# Patient Record
Sex: FEMALE | Race: WHITE | Employment: FULL TIME | URBAN - METROPOLITAN AREA
[De-identification: names, ages, dates, MRNs, and addresses within clinical notes are randomized per-mention and may not be internally consistent; named-entity substitution may affect disease eponyms.]

---

## 2017-04-05 DIAGNOSIS — N95.1 MENOPAUSAL SYNDROME: ICD-10-CM

## 2017-04-05 RX ORDER — ESTRADIOL 0.5 MG/1
TABLET ORAL
Qty: 90 TAB | Refills: 0 | Status: SHIPPED | OUTPATIENT
Start: 2017-04-05 | End: 2017-07-03 | Stop reason: DRUGHIGH

## 2017-04-10 ENCOUNTER — OFFICE VISIT (OUTPATIENT)
Dept: OBGYN CLINIC | Age: 47
End: 2017-04-10

## 2017-04-10 VITALS
SYSTOLIC BLOOD PRESSURE: 114 MMHG | BODY MASS INDEX: 31.18 KG/M2 | WEIGHT: 176 LBS | DIASTOLIC BLOOD PRESSURE: 72 MMHG | HEIGHT: 63 IN

## 2017-04-10 DIAGNOSIS — N39.3 STRESS INCONTINENCE OF URINE: Primary | ICD-10-CM

## 2017-04-10 LAB
BILIRUB UR QL STRIP: NEGATIVE
GLUCOSE UR-MCNC: NEGATIVE MG/DL
KETONES P FAST UR STRIP-MCNC: NEGATIVE MG/DL
PH UR STRIP: 4.6 [PH] (ref 4.6–8)
PROT UR QL STRIP: NEGATIVE MG/DL
SP GR UR STRIP: 1 (ref 1–1.03)
UA UROBILINOGEN AMB POC: NORMAL (ref 0.2–1)
URINALYSIS CLARITY POC: CLEAR
URINALYSIS COLOR POC: YELLOW
URINE BLOOD POC: NEGATIVE
URINE LEUKOCYTES POC: NEGATIVE
URINE NITRITES POC: NEGATIVE

## 2017-04-10 NOTE — PROGRESS NOTES
Urinary incontinence evaluation    Lexi Mandujano is a ,  48 y.o. female [facility] who presents today with had concerns including Urinary Incontinence. .     Her symptoms started 4 weeks ago, unchanged since that time. Has actually had longer than that. Just worse now to where she is concerned about it. She leaks urine on a regular basis. She loses urine when she coughs/laughs/sneezes. Symptoms are exacerbated by exercise. Patient's other complaints: none. Her symptoms are moderate. No nocturia. Patient denies back pain, fever and vaginal discharge. There is not any concern of sexual abuse. There is not a history of trauma to the genital area other than childbirth. Patient does not have a history of recurrent UTI. She has a history of  has a past medical history of Autoimmune disease ([facility] [address] Encounter for insertion of mirena IUD; Encounter for IUD removal (14); Hypertension; Ill-defined condition; Migraine; and Unspecified adverse effect of anesthesia. with the following surgical history  has a past surgical history that includes tubal ligation (); cholecystectomy (); and laparoscopic supracervical hysterectomy (14). .  . She does not have a history of previous evaluation of incontinence. She has not been evaluated for her current complaints. Urine dipstick shows negative for all components.   Results for orders placed or performed in visit on 04/10/17   AMB POC URINALYSIS DIP STICK MANUAL W/O MICRO   Result Value Ref Range    Color (UA POC) Yellow     Clarity (UA POC) Clear     Glucose (UA POC) Negative Negative    Bilirubin (UA POC) Negative Negative    Ketones (UA POC) Negative Negative    Specific gravity (UA POC) 1.001 1.001 - 1.035    Blood (UA POC) Negative Negative    pH (UA POC) 4.6 4.6 - 8.0    Protein (UA POC) Negative Negative mg/dL    Urobilinogen (UA POC) 0.2 mg/dL 0.2 - 1    Nitrites (UA POC) Negative Negative    Leukocyte esterase (UA POC) Negative Negative       Past Medical History:   Diagnosis Date    Autoimmune disease ([facility] [address]     Raytom[place]ud    Encounter for insertion of mirena IUD     pt states replaced in March 2013    Encounter for IUD removal 7/21/14    Hypertension     Ill-defined condition     migraines    Migraine     LAST HEADACHE 6-30-14    Unspecified adverse effect of anesthesia     pt states freaks out upon awaking, will try to get up     Past Surgical History:   Procedure Laterality Date    HX CHOLECYSTECTOMY  2012    HX LAPAROSCOPIC SUPRACERVICAL HYSTERECTOMY  7/21/14    Mirena Removal    HX TUBAL LIGATION  1993     Social History     Occupational History    Not on file. Social History Main Topics    Smoking status: Never Smoker    Smokeless tobacco: Never Used    Alcohol use Yes      Comment: 3-4 DRINKS PER WEEK    Drug use: No    Sexual activity: Yes     Partners: Male     Birth control/ protection: Surgical      Comment: Tubal Ligation//Vasectomy/Hysterectomy     Family History   Problem Relation Age of Onset    Endometriosis Mother     Endometriosis Maternal Grandmother     Lung Disease Father        Allergies   Allergen Reactions    Doxycycline Hives     Prior to Admission medications    Medication Sig Start Date End Date Taking? Authorizing Provider   estradiol (ESTRACE) 0.5 mg tablet TAKE 1 TABLET BY MOUTH DAILY 4/5/17   Daxa Jacobs MD   losartan (COZAAR) 25 mg tablet  5/20/16   Historical Provider   cyclobenzaprine (FLEXERIL) 10 mg tablet  5/23/16   Historical Provider   erythromycin (ILOTYCIN) ophthalmic ointment Administer 0.5 cm to left eye every six (6) hours. 3/18/15   GILDARDO Martin   HYDROcodone-acetaminophen (NORCO) 5-325 mg per tablet Take 1 Tab by mouth every four (4) hours as needed for Pain. Max Daily Amount: 6 Tabs.  3/18/15   GILDARDO Malave   lisinopril (PRINIVIL, ZESTRIL) 10 mg tablet  1/26/15   Historical Provider   cloNIDine HCl (CATAPRES) 0.1 mg tablet  1/22/15   Historical Provider   amoxicillin-clavulanate (AUGMENTIN) 500-125 mg per tablet  8/2/14   Historical Provider   oxyCODONE-acetaminophen (PERCOCET 10)  mg per tablet Take 1 Tab by mouth every six (6) hours as needed for Pain. 7/21/14   Dex Valverde MD   multivitamin (ONE A DAY) tablet Take 1 Tab by mouth daily. Historical Provider   amLODIPine (NORVASC) 5 mg tablet Take 5 mg by mouth daily. 4/24/14   Historical Provider   hydrochlorothiazide (HYDRODIURIL) 25 mg tablet 25 mg daily. 4/25/14   Historical Provider   naproxen (NAPROSYN) 500 mg tablet Take 500 mg by mouth every six (6) hours as needed.  5/20/14   Historical Provider        Review of Systems: History obtained from the patient  Constitutional: negative for weight loss, fever, night sweats  Breast: negative for breast lumps, nipple discharge, galactorrhea  GI: negative for change in bowel habits, abdominal pain, black or bloody stools  : see HPI, negative for vaginal discharge  MSK: negative for back pain, joint pain, muscle pain  Skin: negative for itching, rash, hives  Psych: negative for anxiety, depression, change in mood      Objective:    Visit Vitals    /72    Ht 5' 3\" (1.6 m)    Wt 176 lb (79.8 kg)    LMP 05/20/2014    BMI 31.18 kg/m2       Physical Exam:   PHYSICAL EXAMINATION    Constitutional  · Appearance: well-nourished, well developed, alert, in no acute distress    Gastrointestinal  · Abdominal Examination: abdomen non-tender to palpation, normal bowel sounds, no masses present  · Liver and spleen: no hepatomegaly present, spleen not palpable  · Hernias: no hernias identified    Genitourinary  · External Genitalia: normal appearance for age, no discharge present, no tenderness present, no inflammatory lesions present, no masses present, no atrophy present  · Vagina: normal vaginal vault with moderate cystocele and bladder neck defects, no discharge present, no inflammatory lesions present, no masses present  · Bladder: tender to palpation  · Urethra: appears normal  · Cervix: normal   · Uterus: absent  · Adnexa: no adnexal tenderness present, no adnexal masses present  · Perineum: perineum within normal limits, no evidence of trauma, no rashes or skin lesions present  · Anus: anus within normal limits, no hemorrhoids present  · Inguinal Lymph Nodes: no lymphadenopathy present    Skin  · General Inspection: no rash, no lesions identified    Neurologic/Psychiatric  · Mental Status:  · Orientation: grossly oriented to person, place and time  · Mood and Affect: mood normal, affect appropriate    Assessment:   Urinary incontinence, stress. Mild to moderate cystocele. Plan:   URO and TVT.   IC obtained, NFQ.

## 2017-04-27 DIAGNOSIS — N39.3 FEMALE STRESS INCONTINENCE: Primary | ICD-10-CM

## 2017-05-19 ENCOUNTER — OFFICE VISIT (OUTPATIENT)
Dept: OBGYN CLINIC | Age: 47
End: 2017-05-19

## 2017-05-19 ENCOUNTER — HOSPITAL ENCOUNTER (OUTPATIENT)
Dept: PREADMISSION TESTING | Age: 47
Discharge: HOME | End: 2017-05-19
Payer: COMMERCIAL

## 2017-05-19 ENCOUNTER — TELEPHONE (OUTPATIENT)
Dept: OBGYN CLINIC | Age: 47
End: 2017-05-19

## 2017-05-19 VITALS
SYSTOLIC BLOOD PRESSURE: 118 MMHG | HEART RATE: 60 BPM | RESPIRATION RATE: 20 BRPM | OXYGEN SATURATION: 100 % | DIASTOLIC BLOOD PRESSURE: 79 MMHG | TEMPERATURE: 97.3 F

## 2017-05-19 VITALS
SYSTOLIC BLOOD PRESSURE: 132 MMHG | DIASTOLIC BLOOD PRESSURE: 84 MMHG | WEIGHT: 179 LBS | HEIGHT: 63 IN | BODY MASS INDEX: 31.71 KG/M2

## 2017-05-19 DIAGNOSIS — N39.3 SUI (STRESS URINARY INCONTINENCE, FEMALE): Primary | ICD-10-CM

## 2017-05-19 DIAGNOSIS — N39.3 FEMALE STRESS INCONTINENCE: ICD-10-CM

## 2017-05-19 LAB
ABO + RH BLD: NORMAL
ANION GAP BLD CALC-SCNC: 6 MMOL/L (ref 5–15)
ATRIAL RATE: 58 BPM
BLOOD GROUP ANTIBODIES SERPL: NORMAL
BUN SERPL-MCNC: 14 MG/DL (ref 6–20)
BUN/CREAT SERPL: 18 (ref 12–20)
CALCIUM SERPL-MCNC: 8.8 MG/DL (ref 8.5–10.1)
CALCULATED P AXIS, ECG09: 30 DEGREES
CALCULATED R AXIS, ECG10: 43 DEGREES
CALCULATED T AXIS, ECG11: 23 DEGREES
CHLORIDE SERPL-SCNC: 101 MMOL/L (ref 97–108)
CO2 SERPL-SCNC: 30 MMOL/L (ref 21–32)
CREAT SERPL-MCNC: 0.8 MG/DL (ref 0.55–1.02)
DIAGNOSIS, 93000: NORMAL
ERYTHROCYTE [DISTWIDTH] IN BLOOD BY AUTOMATED COUNT: 12.9 % (ref 11.5–14.5)
GLUCOSE SERPL-MCNC: 82 MG/DL (ref 65–100)
HCT VFR BLD AUTO: 40 % (ref 35–47)
HGB BLD-MCNC: 13.9 G/DL (ref 11.5–16)
MCH RBC QN AUTO: 29.6 PG (ref 26–34)
MCHC RBC AUTO-ENTMCNC: 34.8 G/DL (ref 30–36.5)
MCV RBC AUTO: 85.3 FL (ref 80–99)
P-R INTERVAL, ECG05: 150 MS
PLATELET # BLD AUTO: 200 K/UL (ref 150–400)
POTASSIUM SERPL-SCNC: 3.7 MMOL/L (ref 3.5–5.1)
Q-T INTERVAL, ECG07: 422 MS
QRS DURATION, ECG06: 86 MS
QTC CALCULATION (BEZET), ECG08: 414 MS
RBC # BLD AUTO: 4.69 M/UL (ref 3.8–5.2)
SODIUM SERPL-SCNC: 137 MMOL/L (ref 136–145)
SPECIMEN EXP DATE BLD: NORMAL
VENTRICULAR RATE, ECG03: 58 BPM
WBC # BLD AUTO: 4.3 K/UL (ref 3.6–11)

## 2017-05-19 PROCEDURE — 36415 COLL VENOUS BLD VENIPUNCTURE: CPT | Performed by: OBSTETRICS & GYNECOLOGY

## 2017-05-19 PROCEDURE — 93005 ELECTROCARDIOGRAM TRACING: CPT

## 2017-05-19 PROCEDURE — 86900 BLOOD TYPING SEROLOGIC ABO: CPT | Performed by: OBSTETRICS & GYNECOLOGY

## 2017-05-19 PROCEDURE — 85027 COMPLETE CBC AUTOMATED: CPT | Performed by: OBSTETRICS & GYNECOLOGY

## 2017-05-19 PROCEDURE — 80048 BASIC METABOLIC PNL TOTAL CA: CPT | Performed by: ANESTHESIOLOGY

## 2017-05-19 RX ORDER — RIZATRIPTAN BENZOATE 10 MG/1
10 TABLET ORAL
COMMUNITY

## 2017-05-19 RX ORDER — CEPHALEXIN 500 MG/1
500 CAPSULE ORAL 4 TIMES DAILY
COMMUNITY

## 2017-05-19 RX ORDER — HYDROCHLOROTHIAZIDE 12.5 MG/1
12.5 TABLET ORAL DAILY
COMMUNITY

## 2017-05-19 NOTE — PROGRESS NOTES
[facility]  OFFICE PROCEDURE PROGRESS NOTE           Chart reviewed for the following:  Alejandro SIMMONS, have reviewed the History, Physical and updated the Allergic reactions for [address] performed immediately prior to start of procedure:  Alejandro SIMMONS, have performed the following reviews on [address] prior to the start of the procedure:      * Patient was identified by name and date of birth   * Agreement on procedure being performed was verified  * Risks and Benefits explained to the patient  * Procedure site verified and marked as necessary  * Patient was positioned for comfort  * Consent was signed and verified      Time: 1:20pm        Date of procedure: 2017     Procedure performed by: Kimberly Mark MD     Provider assisted by: Alejandro Minaya MA     Patient assisted by: self     How tolerated by patient: tolerated the procedure well with no complications     Post Procedural Pain Scale: 0 - No Hurt     Comments: none      Procedure note: Urodynamics      Indications:  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 female [facility] Patient's last menstrual period was 2014. She complains of moderate and life-altering symptoms of stress incontinence and prolapse for years. She is here today for urodynamic testing. Procedure: Urodynamic testing was performed with the Lumax machine. Pressure flow testing via Lumax device was performed. Voided volume was 457 cc. Maximum flow rate achieved was 25.9 cc/sec. Average flow rate was 18.3 cc/sec. Post void residual was 30 cc (15 minutes later). She felt the first filling sensation at 353 cc, had the urge to urinate at 389 cc, strong urge at 401 cc, and very uncomfortable at 409 cc. Bladder filling showed normal pressures   The total bladder volume achieved was 409 cc. Leak point pressure was achieved at 33 cm of water. The leak volume was 8 cc. Q-tip deflection was 30 degrees.    She was noted to lose urine when she coughed in the sitting position. There was no urine loss with manual support of one side of the urethra. EMG was not completed. Urethral sphincter length is 2 cm. Maximum urethral closure pressure was 62 cm of water. Impression:  Pure USI with normal capacity bladder.   Plan:  TVT-O with 500 cc voiding trial.      Care Provider: Cydney Ellis M.D.

## 2017-05-19 NOTE — TELEPHONE ENCOUNTER
Patient called to find out what was expected for the Urodynamics. Stated she misplaced her paperwork. Informed her to have a full bladder. She verbalized understanding.

## 2017-05-19 NOTE — PERIOP NOTES
[facility]  PREOPERATIVE INSTRUCTIONS    Surgery Date:   5/25/17  Surgery arrival time given by surgeon: NO   If no,[facility] [address] staff will call you between 4 PM- 8 PM the day before surgery with your arrival time. If your surgery is on a Monday, we will call you the preceding Friday. Please call [phone] after 8 PM if you did not receive your arrival time. 1. Please report at the designated time to the 2nd [address] [facility]. Bring your insurance card, photo identification, and any copayment ( if applicable). 2. You must have a responsible adult to drive you home. You need to have a responsible adult to stay with you the first 24 hours after surgery if you are going home the same day of your surgery and you should not drive a car for 24 hours following your surgery. 3. Nothing to eat or drink after midnight the night before surgery. This includes no water, gum, mints, coffee, juice, etc.  Please note special instructions, if applicable, below for medications. 4. MEDICATIONS TO TAKE THE MORNING OF SURGERY WITH A SIP OF WATER: none  5. No alcoholic beverages 24 hours before or after your surgery. 6. If you are being admitted to the hospital,please leave personal belongings/luggage in your car until you have an assigned hospital room number. 7. Stop Aspirin and/or any non-steroidal anti-inflammatory drugs (i.e. Ibuprofen, Naproxen, Advil, Aleve) as directed by your surgeon. You may take Tylenol. Stop herbal supplements 1 week prior to  surgery. 8. If you are currently taking Plavix, Coumadin,or any other blood-thinning/anticoagulant medication contact your surgeon for instructions. 9. Please wear comfortable clothes. Wear your glasses instead of contacts. We ask that all money, jewelry and valuables be left at home. Wear no make up, particularly mascara, the day of surgery. 10.  All body piercings, rings,and jewelry need to be removed and left at home. Please wear your hair loose or down.  Please no pony-tails, buns, or any metal hair accessories. If you shower the morning of surgery, please do not apply any lotions, powders, or deodorants afterwards. Do not shave any body area within 24 hours of your surgery. 11. Please follow all instructions to avoid any potential surgical cancellation. 12.  Should your physical condition change, (i.e. fever, cold, flu, etc.) please notify your surgeon as soon as possible. 13. It is important to be on time. If a situation occurs where you may be delayed, please call:  [phone] on the day of surgery. 14. The Preadmission Testing staff can be reached at [phone]. .  15. Special instructions: free  parking,     The patient was contacted  in person. She  verbalize  understanding of all instructions does not  need reinforcement.

## 2017-05-24 ENCOUNTER — ANESTHESIA EVENT (OUTPATIENT)
Dept: SURGERY | Age: 47
End: 2017-05-24
Payer: COMMERCIAL

## 2017-05-24 NOTE — H&P
Gynecology History and Physical    Name: Florencia Logan MRN: [id number] SSN: [id number]    YOB: 1970  Age: 48 y.o. Sex: female       Subjective:      Chief complaint: Urinary incontinence    Namita Tatum is a 48 y.o.  female with a history of urinary incontinence. Previous evaluation has been done with urodynamics, confirming the presence of stress incontinence and the need for urethral support surgery. Previous treatment has consisted of kegel exercises and wearing protection. She is now admitted for outpatient surgery consisting of TVT-O with cystoscopy. (Procedure(s) (LRB):  TENSION FREE VAGINAL TAPING OBTURATOR  (N/A))    The current method of family planning is status post hysterectomy. OB History      Para Term  AB TAB SAB Ectopic Multiple Living    2 2 2       2        Past Medical History:   Diagnosis Date    Autoimmune disease ([facility] [address]     Raynaud    Encounter for insertion of mirena IUD     pt states replaced in 2013    Encounter for IUD removal 14    Hypertension     Ill-defined condition     migraines    Migraine     LAST HEADACHE 14    Unspecified adverse effect of anesthesia     pt states freaks out upon awaking, will try to get up     Past Surgical History:   Procedure Laterality Date    HX CHOLECYSTECTOMY      HX HYSTERECTOMY      HX LAPAROSCOPIC SUPRACERVICAL HYSTERECTOMY  14    Mirena Removal    HX ORTHOPAEDIC Left     CTR    [address]    HX UROLOGICAL  2017    Urodynamics     Social History     Occupational History    Not on file.      Social History Main Topics    Smoking status: Never Smoker    Smokeless tobacco: Never Used    Alcohol use Yes      Comment: 3-4 DRINKS PER WEEK    Drug use: No    Sexual activity: Yes     Partners: Male     Birth control/ protection: Surgical      Comment: Tubal Ligation//Vasectomy/Hysterectomy     Family History   Problem Relation Age of Onset    Endometriosis Mother     Lung Disease Father     Endometriosis Maternal Grandmother         Allergies   Allergen Reactions    Doxycycline Hives     Prior to Admission medications    Medication Sig Start Date End Date Taking? Authorizing Provider   hydroCHLOROthiazide (HYDRODIURIL) 12.5 mg tablet Take 12.5 mg by mouth daily. Historical Provider   LOSARTAN PO Take 12.5 mg by mouth daily. Historical Provider   rizatriptan (MAXALT) 10 mg tablet Take 10 mg by mouth once as needed for Migraine. May repeat in 2 hours if needed    Historical Provider   cephALEXin (KEFLEX) 500 mg capsule Take 500 mg by mouth four (4) times daily. Historical Provider   estradiol (ESTRACE) 0.5 mg tablet TAKE 1 TABLET BY MOUTH DAILY 4/5/17   Owen Brown MD   cyclobenzaprine (FLEXERIL) 10 mg tablet daily as needed. 5/23/16   Historical Provider        Review of Systems:  History obtained from the patient  Negative for:  Constitutional: weight loss, fever, night sweats  HEENT: hearing loss, earache, congestion, snoring, sorethroat  CV: chest pain, palpitations, edema  Resp: cough, shortness of breath, wheezing  Breast: breast lumps, nipple discharge, galactorrhea  GI: change in bowel habits, abdominal pain, black or bloody stools  : frequency, dysuria, hematuria, vaginal discharge  MSK: back pain, joint pain, muscle pain  Skin: itching, rash, hives  Neuro: dizziness, headache, confusion, weakness  Psych: anxiety, depression, change in mood  Heme/lymph: bleeding, bruising, pallor        Objective: There were no vitals filed for this visit.     Physical Exam:  Heart: Regular rate and rhythm  Lung: clear to auscultation throughout lung fields, no wheezes, no rales, no rhonchi and normal respiratory effort  Abdomen: soft, nontender  External Genitalia: normal general appearance  Urinary system: urethral meatus normal  Vagina: normal mucosa without prolapse or lesions  Cervix: normal appearance  Adnexa: normal bimanual exam  Uterus: absent and removed surgically    Assessment:   Urinary incontinence with stress incontinence. TVT-O IC obtained, NFQ  400 cc voiding trial.    Plan:     Procedure(s) (LRB):  TENSION FREE VAGINAL TAPING OBTURATOR  (N/A)  Discussed the risks of surgery including the risks of bleeding, infection, deep vein thrombosis, and surgical injuries to internal organs including but not limited to the bowels, bladder, rectum, and female reproductive organs. The patient understands the risks; any and all questions were answered to the patient's satisfaction.     Signed By:  Owen Brown MD     May 24, 2017

## 2017-05-25 ENCOUNTER — ANESTHESIA (OUTPATIENT)
Dept: SURGERY | Age: 47
End: 2017-05-25
Payer: COMMERCIAL

## 2017-05-25 ENCOUNTER — HOSPITAL ENCOUNTER (OUTPATIENT)
Age: 47
Setting detail: OUTPATIENT SURGERY
Discharge: HOME | End: 2017-05-25
Attending: OBSTETRICS & GYNECOLOGY | Admitting: OBSTETRICS & GYNECOLOGY
Payer: COMMERCIAL

## 2017-05-25 VITALS
DIASTOLIC BLOOD PRESSURE: 69 MMHG | OXYGEN SATURATION: 98 % | HEART RATE: 65 BPM | SYSTOLIC BLOOD PRESSURE: 103 MMHG | RESPIRATION RATE: 16 BRPM | TEMPERATURE: 97.5 F

## 2017-05-25 DIAGNOSIS — N39.3 FEMALE STRESS INCONTINENCE: ICD-10-CM

## 2017-05-25 PROCEDURE — 77030031139 HC SUT VCRL2 J&J -A: Performed by: OBSTETRICS & GYNECOLOGY

## 2017-05-25 PROCEDURE — 74011250636 HC RX REV CODE- 250/636: Performed by: ANESTHESIOLOGY

## 2017-05-25 PROCEDURE — 77030018836 HC SOL IRR NACL ICUM -A

## 2017-05-25 PROCEDURE — 76010000138 HC OR TIME 0.5 TO 1 HR: Performed by: OBSTETRICS & GYNECOLOGY

## 2017-05-25 PROCEDURE — 77030034850: Performed by: OBSTETRICS & GYNECOLOGY

## 2017-05-25 PROCEDURE — 74011250636 HC RX REV CODE- 250/636

## 2017-05-25 PROCEDURE — 74011000250 HC RX REV CODE- 250: Performed by: OBSTETRICS & GYNECOLOGY

## 2017-05-25 PROCEDURE — 77030019927 HC TBNG IRR CYSTO BAXT -A: Performed by: OBSTETRICS & GYNECOLOGY

## 2017-05-25 PROCEDURE — 76060000032 HC ANESTHESIA 0.5 TO 1 HR: Performed by: OBSTETRICS & GYNECOLOGY

## 2017-05-25 PROCEDURE — C1771 REP DEV, URINARY, W/SLING: HCPCS | Performed by: OBSTETRICS & GYNECOLOGY

## 2017-05-25 PROCEDURE — 77030011640 HC PAD GRND REM COVD -A: Performed by: OBSTETRICS & GYNECOLOGY

## 2017-05-25 PROCEDURE — 77030032490 HC SLV COMPR SCD KNE COVD -B: Performed by: OBSTETRICS & GYNECOLOGY

## 2017-05-25 PROCEDURE — 77030018836 HC SOL IRR NACL ICUM -A: Performed by: OBSTETRICS & GYNECOLOGY

## 2017-05-25 PROCEDURE — 76210000023 HC REC RM PH II 2 TO 2.5 HR: Performed by: OBSTETRICS & GYNECOLOGY

## 2017-05-25 PROCEDURE — 74011250636 HC RX REV CODE- 250/636: Performed by: OBSTETRICS & GYNECOLOGY

## 2017-05-25 PROCEDURE — 76210000006 HC OR PH I REC 0.5 TO 1 HR: Performed by: OBSTETRICS & GYNECOLOGY

## 2017-05-25 PROCEDURE — 77030018832 HC SOL IRR H20 ICUM -A: Performed by: OBSTETRICS & GYNECOLOGY

## 2017-05-25 PROCEDURE — 77030020782 HC GWN BAIR PAWS FLX 3M -B

## 2017-05-25 PROCEDURE — 74011000250 HC RX REV CODE- 250

## 2017-05-25 PROCEDURE — 77030020143 HC AIRWY LARYN INTUB CGAS -A: Performed by: ANESTHESIOLOGY

## 2017-05-25 DEVICE — SLING GYN TRANSOBTURATOR APPRCH GYNECARE TVT ABBREVO: Type: IMPLANTABLE DEVICE | Site: VAGINA | Status: FUNCTIONAL

## 2017-05-25 RX ORDER — SODIUM CHLORIDE 0.9 % (FLUSH) 0.9 %
5-10 SYRINGE (ML) INJECTION EVERY 8 HOURS
Status: DISCONTINUED | OUTPATIENT
Start: 2017-05-25 | End: 2017-05-25 | Stop reason: HOSPADM

## 2017-05-25 RX ORDER — HYDROCODONE BITARTRATE AND ACETAMINOPHEN 7.5; 325 MG/1; MG/1
1 TABLET ORAL
Qty: 24 TAB | Refills: 0 | Status: SHIPPED | OUTPATIENT
Start: 2017-05-25

## 2017-05-25 RX ORDER — LIDOCAINE HYDROCHLORIDE 20 MG/ML
INJECTION, SOLUTION EPIDURAL; INFILTRATION; INTRACAUDAL; PERINEURAL AS NEEDED
Status: DISCONTINUED | OUTPATIENT
Start: 2017-05-25 | End: 2017-05-25 | Stop reason: HOSPADM

## 2017-05-25 RX ORDER — DEXAMETHASONE SODIUM PHOSPHATE 4 MG/ML
INJECTION, SOLUTION INTRA-ARTICULAR; INTRALESIONAL; INTRAMUSCULAR; INTRAVENOUS; SOFT TISSUE AS NEEDED
Status: DISCONTINUED | OUTPATIENT
Start: 2017-05-25 | End: 2017-05-25 | Stop reason: HOSPADM

## 2017-05-25 RX ORDER — PROPOFOL 10 MG/ML
INJECTION, EMULSION INTRAVENOUS AS NEEDED
Status: DISCONTINUED | OUTPATIENT
Start: 2017-05-25 | End: 2017-05-25 | Stop reason: HOSPADM

## 2017-05-25 RX ORDER — HYDROMORPHONE HYDROCHLORIDE 1 MG/ML
.25-1 INJECTION, SOLUTION INTRAMUSCULAR; INTRAVENOUS; SUBCUTANEOUS
Status: DISCONTINUED | OUTPATIENT
Start: 2017-05-25 | End: 2017-05-26 | Stop reason: HOSPADM

## 2017-05-25 RX ORDER — SODIUM CHLORIDE 0.9 % (FLUSH) 0.9 %
5-10 SYRINGE (ML) INJECTION AS NEEDED
Status: DISCONTINUED | OUTPATIENT
Start: 2017-05-25 | End: 2017-05-26 | Stop reason: HOSPADM

## 2017-05-25 RX ORDER — SODIUM CHLORIDE, SODIUM LACTATE, POTASSIUM CHLORIDE, CALCIUM CHLORIDE 600; 310; 30; 20 MG/100ML; MG/100ML; MG/100ML; MG/100ML
125 INJECTION, SOLUTION INTRAVENOUS CONTINUOUS
Status: DISCONTINUED | OUTPATIENT
Start: 2017-05-25 | End: 2017-05-26 | Stop reason: HOSPADM

## 2017-05-25 RX ORDER — LIDOCAINE HYDROCHLORIDE 10 MG/ML
0.1 INJECTION, SOLUTION EPIDURAL; INFILTRATION; INTRACAUDAL; PERINEURAL AS NEEDED
Status: DISCONTINUED | OUTPATIENT
Start: 2017-05-25 | End: 2017-05-25 | Stop reason: HOSPADM

## 2017-05-25 RX ORDER — SODIUM CHLORIDE 0.9 % (FLUSH) 0.9 %
5-10 SYRINGE (ML) INJECTION AS NEEDED
Status: DISCONTINUED | OUTPATIENT
Start: 2017-05-25 | End: 2017-05-25 | Stop reason: HOSPADM

## 2017-05-25 RX ORDER — DIPHENHYDRAMINE HYDROCHLORIDE 50 MG/ML
12.5 INJECTION, SOLUTION INTRAMUSCULAR; INTRAVENOUS AS NEEDED
Status: ACTIVE | OUTPATIENT
Start: 2017-05-25 | End: 2017-05-25

## 2017-05-25 RX ORDER — MIDAZOLAM HYDROCHLORIDE 1 MG/ML
INJECTION, SOLUTION INTRAMUSCULAR; INTRAVENOUS AS NEEDED
Status: DISCONTINUED | OUTPATIENT
Start: 2017-05-25 | End: 2017-05-25 | Stop reason: HOSPADM

## 2017-05-25 RX ORDER — BUPIVACAINE HYDROCHLORIDE AND EPINEPHRINE 2.5; 5 MG/ML; UG/ML
INJECTION, SOLUTION EPIDURAL; INFILTRATION; INTRACAUDAL; PERINEURAL AS NEEDED
Status: DISCONTINUED | OUTPATIENT
Start: 2017-05-25 | End: 2017-05-25 | Stop reason: HOSPADM

## 2017-05-25 RX ORDER — FENTANYL CITRATE 50 UG/ML
INJECTION, SOLUTION INTRAMUSCULAR; INTRAVENOUS AS NEEDED
Status: DISCONTINUED | OUTPATIENT
Start: 2017-05-25 | End: 2017-05-25 | Stop reason: HOSPADM

## 2017-05-25 RX ORDER — ONDANSETRON 2 MG/ML
INJECTION INTRAMUSCULAR; INTRAVENOUS AS NEEDED
Status: DISCONTINUED | OUTPATIENT
Start: 2017-05-25 | End: 2017-05-25 | Stop reason: HOSPADM

## 2017-05-25 RX ORDER — KETOROLAC TROMETHAMINE 30 MG/ML
INJECTION, SOLUTION INTRAMUSCULAR; INTRAVENOUS AS NEEDED
Status: DISCONTINUED | OUTPATIENT
Start: 2017-05-25 | End: 2017-05-25 | Stop reason: HOSPADM

## 2017-05-25 RX ORDER — ONDANSETRON 2 MG/ML
4 INJECTION INTRAMUSCULAR; INTRAVENOUS AS NEEDED
Status: DISCONTINUED | OUTPATIENT
Start: 2017-05-25 | End: 2017-05-26 | Stop reason: HOSPADM

## 2017-05-25 RX ORDER — SODIUM CHLORIDE, SODIUM LACTATE, POTASSIUM CHLORIDE, CALCIUM CHLORIDE 600; 310; 30; 20 MG/100ML; MG/100ML; MG/100ML; MG/100ML
100 INJECTION, SOLUTION INTRAVENOUS CONTINUOUS
Status: DISCONTINUED | OUTPATIENT
Start: 2017-05-25 | End: 2017-05-25 | Stop reason: HOSPADM

## 2017-05-25 RX ORDER — CEFAZOLIN SODIUM IN 0.9 % NACL 2 G/50 ML
2 INTRAVENOUS SOLUTION, PIGGYBACK (ML) INTRAVENOUS
Status: COMPLETED | OUTPATIENT
Start: 2017-05-25 | End: 2017-05-25

## 2017-05-25 RX ADMIN — SODIUM CHLORIDE, SODIUM LACTATE, POTASSIUM CHLORIDE, AND CALCIUM CHLORIDE: 600; 310; 30; 20 INJECTION, SOLUTION INTRAVENOUS at 08:36

## 2017-05-25 RX ADMIN — FENTANYL CITRATE 50 MCG: 50 INJECTION, SOLUTION INTRAMUSCULAR; INTRAVENOUS at 07:56

## 2017-05-25 RX ADMIN — FENTANYL CITRATE 50 MCG: 50 INJECTION, SOLUTION INTRAMUSCULAR; INTRAVENOUS at 08:00

## 2017-05-25 RX ADMIN — MIDAZOLAM HYDROCHLORIDE 1 MG: 1 INJECTION, SOLUTION INTRAMUSCULAR; INTRAVENOUS at 08:00

## 2017-05-25 RX ADMIN — ONDANSETRON 4 MG: 2 INJECTION INTRAMUSCULAR; INTRAVENOUS at 08:37

## 2017-05-25 RX ADMIN — LIDOCAINE HYDROCHLORIDE 60 MG: 20 INJECTION, SOLUTION EPIDURAL; INFILTRATION; INTRACAUDAL; PERINEURAL at 08:00

## 2017-05-25 RX ADMIN — SODIUM CHLORIDE, SODIUM LACTATE, POTASSIUM CHLORIDE, AND CALCIUM CHLORIDE 100 ML/HR: 600; 310; 30; 20 INJECTION, SOLUTION INTRAVENOUS at 07:10

## 2017-05-25 RX ADMIN — MIDAZOLAM HYDROCHLORIDE 3 MG: 1 INJECTION, SOLUTION INTRAMUSCULAR; INTRAVENOUS at 07:56

## 2017-05-25 RX ADMIN — CEFAZOLIN 2 G: 1 INJECTION, POWDER, FOR SOLUTION INTRAMUSCULAR; INTRAVENOUS; PARENTERAL at 08:07

## 2017-05-25 RX ADMIN — KETOROLAC TROMETHAMINE 30 MG: 30 INJECTION, SOLUTION INTRAMUSCULAR; INTRAVENOUS at 08:41

## 2017-05-25 RX ADMIN — PROPOFOL 200 MG: 10 INJECTION, EMULSION INTRAVENOUS at 08:04

## 2017-05-25 RX ADMIN — DEXAMETHASONE SODIUM PHOSPHATE 8 MG: 4 INJECTION, SOLUTION INTRA-ARTICULAR; INTRALESIONAL; INTRAMUSCULAR; INTRAVENOUS; SOFT TISSUE at 08:12

## 2017-05-25 RX ADMIN — HYDROMORPHONE HYDROCHLORIDE 0.5 MG: 1 INJECTION, SOLUTION INTRAMUSCULAR; INTRAVENOUS; SUBCUTANEOUS at 08:55

## 2017-05-25 NOTE — OP NOTES
TVT Operative summary    Name: Landon [facility] Record Number: [id number]   YOB: 1970  Date of Surgery: 5/25/2017    Preoperative Diagnosis: STRESS URINARY INCONTINENCE    Postoperative Diagnosis: STRESS URINARY INCONTINENCE    Surgeon:  Cory Gimenez MD     Assistant:  none    Anesthesia: General    Procedure: Procedure(s):  TENSION FREE VAGINAL TAPING OBTURATOR ;CYSTOSCOPY     Findings: Hypermobile urethra    Estimated Blood Loss:  less than 50 cc    Drains: none    Pathology /Specimens:  * No specimens in log *    DVT Prophylaxis: SCD Hose    Antibiotic Prophylaxis: Ancef:    INDICATIONS:    Informed consent was obtained for the above procedure, and the patient stated that she had no further questions. OPERATIVE SUMMARY: The patient was prepped and draped in the dorsal lithotomy position after institution of general anesthesia. A Paez catheter was placed sterilely, and then the mid urethra identified. The sub-urethral space was injected with a dilute solution of Marcaine. A 2 cm vertical incision was made, and dissection accomplished out to the obturator membrane. It was not punctured. The winged guide was placed and then the TVT-O device was placed on each side using the helical needle passers without difficulty. The urethra was elevated to its normal position and then the slack taken out of the TVT-ABBREVO device. It was then held in place by removing the sleeves and the sutures on each side. The vaginal epithelium was closed with a running mattress suture of 2-0 Vicryl. The Paez catheter was removed, the bladder was inspected with cystoscopy, and all surfaces appeared to be completely normal. There was prompt spill from both ureteral orifices. There were no signs of injury to the bladder. The scope was removed and the Paez catheter replaced. The patient was awakened and taken to the PACU in good condition.   Signed By:  Cory Gimenez MD     May 25, 2017

## 2017-05-25 NOTE — DISCHARGE INSTRUCTIONS
MyChart Activation    Thank you for enrolling in [address] Please follow the instructions below to securely access your online medical record. [facility] allows you to send messages to your doctor, view your test results, renew your prescriptions, schedule appointments, and more. How Do I Sign Up? 1. In your internet browser, go to [email or web address]. 2. Click on the First Time User? Click Here link in the Sign In box. You will see the New Member Sign Up page. 3. Enter your [employer] Access Code exactly as it appears below. You will not need to use this code after youve completed the sign-up process. If you do not sign up before the expiration date, you must request a new code. [facility] Access Code: Activation code not generated  Current [facility] Status: Active     4. Enter the last four digits of your Social Security Number (xxxx) and Date of Birth (mm/dd/yyyy) as indicated and click Submit. You will be taken to the next sign-up page. 5. Create a [facility] ID. This will be your [facility] login ID and cannot be changed, so think of one that is secure and easy to remember. 6. Create a [facility] password. You can change your password at any time. 7. Enter your Password Reset Question and Answer. This can be used at a later time if you forget your password. 8. Enter your e-mail address. You will receive e-mail notification when new information is available in [address] 9. Click Sign Up. You can now view your medical record. Additional Information    Remember, [facility] is NOT to be used for urgent needs. For medical emergencies, dial 911. Now available from your iPhone and Android!       DISCHARGE SUMMARY from Nurse    The following personal items are in your possession at time of discharge:    Dental Appliances: None  Visual Aid: Glasses, Sent home  Hearing Aids/Status: Does not own  Home Medications: None  Jewelry: None  Clothing: Shirt, Socks, Undergarments, Footwear, Pants (placed in demario  Other Valuables: Ectore (given to )       PATIENT INSTRUCTIONS:    After general anesthesia or intravenous sedation, for 24 hours or while taking prescription Narcotics:  · Limit your activities  · Do not drive and operate hazardous machinery  · Do not make important personal or business decisions  · Do  not drink alcoholic beverages  · If you have not urinated within 8 hours after discharge, please contact your surgeon on call. Report the following to your surgeon:  · Excessive pain, swelling, redness or odor of or around the surgical area  · Temperature over 100.5  · Nausea and vomiting lasting longer than 4 hours or if unable to take medications  · Any signs of decreased circulation or nerve impairment to extremity: change in color, persistent  numbness, tingling, coldness or increase pain  · Any questions      These are general instructions for a healthy lifestyle:    No smoking/ No tobacco products/ Avoid exposure to second hand smoke    Surgeon General's Warning:  Quitting smoking now greatly reduces serious risk to your health. Obesity, smoking, and sedentary lifestyle greatly increases your risk for illness    A healthy diet, regular physical exercise & weight monitoring are important for maintaining a healthy lifestyle    You may be retaining fluid if you have a history of heart failure or if you experience any of the following symptoms:  Weight gain of 3 pounds or more overnight or 5 pounds in a week, increased swelling in our hands or feet or shortness of breath while lying flat in bed. Please call your doctor as soon as you notice any of these symptoms; do not wait until your next office visit. Recognize signs and symptoms of STROKE:    F-face looks uneven    A-arms unable to move or move unevenly    S-speech slurred or non-existent    T-time-call 911 as soon as signs and symptoms begin-DO NOT go       Back to bed or wait to see if you get better-TIME IS BRAIN.     Warning Signs of HEART ATTACK     Call 911 if you have these symptoms:   Chest discomfort. Most heart attacks involve discomfort in the center of the chest that lasts more than a few minutes, or that goes away and comes back. It can feel like uncomfortable pressure, squeezing, fullness, or pain.  Discomfort in other areas of the upper body. Symptoms can include pain or discomfort in one or both arms, the back, neck, jaw, or stomach.  Shortness of breath with or without chest discomfort.  Other signs may include breaking out in a cold sweat, nausea, or lightheadedness. Don't wait more than five minutes to call 911 - MINUTES MATTER! Fast action can save your life. Calling 911 is almost always the fastest way to get lifesaving treatment. Emergency Medical Services staff can begin treatment when they arrive -- up to an hour sooner than if someone gets to the hospital by car. The discharge information has been reviewed with the patient and spouse. The patient and spouse verbalized understanding. Discharge medications reviewed with the patient and spouse and appropriate educational materials and side effects teaching were provided. TVT  Tension free Vaginal Tape  Procedure-specific postoperative instructions    General Instructions     Make an appointment to come back to the office in about 2-3 weeks. Eat and drink your normal diet. Take laxatives as needed. Call us if you have questions or problems. Bladder care     Your bladder may be irritable following this surgery, so you may need to go more frequently or urgently than before. Please be patient, this will usually go away within the first six weeks without treatment. It is possible, however to get a bladder infection after surgery. If it is burning as you urinate, symptoms are getting worse, or you see blood in your urine consistently, call us. Otherwise, it is important that you empty your bladder when you go.   You may need to take extra time or even press gently above your pubic bone as you urinate to help your bladder empty. What to expect     You have had minor surgery through a tiny incision in the front of the vagina. The most important factor after this surgery to anchor the bladder neck back in place is to not tear it loose. That means no significant lifting for 6 weeks. After that healing is complete and you can resume normal activity. The chances of undoing the surgery are very small, but not zero. You can expect some pulling or sharp sensations. It is not likely to be severe and other than lifting heavy objects or falling you will not damage this repair doing daily activities. As always, if you feel like something is amiss, call the office.

## 2017-05-25 NOTE — H&P
Date of Surgery Update:  Lexi Mandujano was seen and examined. History and physical has been reviewed. The patient has been examined.  There have been no significant clinical changes since the completion of the originally dated History and Physical.    Signed By: Donnie Carranza MD     May 25, 2017 7:40 AM

## 2017-05-25 NOTE — IP AVS SNAPSHOT
Current Discharge Medication List  
  
ASK your doctor about these medications Dose & Instructions Dispensing Information Comments Morning Noon Evening Bedtime  
 cephALEXin 500 mg capsule Commonly known as:  Justice Carlisle Your last dose was: Your next dose is:    
   
   
 Dose:  500 mg Take 500 mg by mouth four (4) times daily. Refills:  0  
     
   
   
   
  
 cyclobenzaprine 10 mg tablet Commonly known as:  FLEXERIL Your last dose was: Your next dose is:    
   
   
 daily as needed. Refills:  0  
     
   
   
   
  
 estradiol 0.5 mg tablet Commonly known as:  ESTRACE Your last dose was: Your next dose is: TAKE 1 TABLET BY MOUTH DAILY Quantity:  90 Tab Refills:  0  
     
   
   
   
  
 hydroCHLOROthiazide 12.5 mg tablet Commonly known as:  HYDRODIURIL Your last dose was: Your next dose is:    
   
   
 Dose:  12.5 mg Take 12.5 mg by mouth daily. Refills:  0 LOSARTAN PO Your last dose was: Your next dose is:    
   
   
 Dose:  12.5 mg Take 12.5 mg by mouth daily. Refills:  0 MAXALT 10 mg tablet Generic drug:  rizatriptan Your last dose was: Your next dose is:    
   
   
 Dose:  10 mg Take 10 mg by mouth once as needed for Migraine. May repeat in 2 hours if needed Refills:  0

## 2017-05-25 NOTE — IP AVS SNAPSHOT
[address] 
[phone] Patient: Dewayne Eric MRN: [id number] Mario Rachid You are allergic to the following Allergen Reactions Doxycycline Hives Recent Documentation OB Status Smoking Status Hysterectomy Never Smoker Emergency Contacts Name Discharge Info Relation Home Work Mobile Gilmar Rogers DISCHARGE CAREGIVER [3] Spouse [3] [phone] About your hospitalization You were admitted on:  May 25, 2017 You last received care in the:  [facility] PACU You were discharged on:  May 25, 2017 Unit phone number:  [phone] Why you were hospitalized Your primary diagnosis was:  Not on File Providers Seen During Your Hospitalizations Provider Role Specialty Primary office phone Alexsandra Irene MD Attending Provider Obstetrics & Gynecology [phone] Your Primary Care Physician (PCP) Primary Care Physician Office Phone Office Fax [place] [address] [place] [address] [phone] Follow-up Information Follow up With Details Comments Contact Info In 2 weeks Current Discharge Medication List  
  
ASK your doctor about these medications Dose & Instructions Dispensing Information Comments Morning Noon Evening Bedtime  
 cephALEXin 500 mg capsule Commonly known as:  [place] Furlough Your last dose was: Your next dose is:    
   
   
 Dose:  500 mg Take 500 mg by mouth four (4) times daily. Refills:  0  
     
   
   
   
  
 cyclobenzaprine 10 mg tablet Commonly known as:  FLEXERIL Your last dose was: Your next dose is:    
   
   
 daily as needed. Refills:  0  
     
   
   
   
  
 estradiol 0.5 mg tablet Commonly known as:  ESTRACE Your last dose was: Your next dose is: TAKE 1 TABLET BY MOUTH DAILY Quantity:  90 Tab Refills:  0  
     
   
   
   
  
 hydroCHLOROthiazide 12.5 mg tablet Commonly known as:  HYDRODIURIL Your last dose was: Your next dose is:    
   
   
 Dose:  12.5 mg Take 12.5 mg by mouth daily. Refills:  0 LOSARTAN PO Your last dose was: Your next dose is:    
   
   
 Dose:  12.5 mg Take 12.5 mg by mouth daily. Refills:  0 MAXALT 10 mg tablet Generic drug:  rizatriptan Your last dose was: Your next dose is:    
   
   
 Dose:  10 mg Take 10 mg by mouth once as needed for Migraine. May repeat in 2 hours if needed Refills:  0 Discharge Instructions MyChart Activation Thank you for enrolling in [address] [facility]. Please follow the instructions below to securely access your online medical record. [employer] allows you to send messages to your doctor, view your test results, renew your prescriptions, schedule appointments, and more. How Do I Sign Up? 1. In your internet browser, go to [email or web address]. 2. Click on the First Time User? Click Here link in the Sign In box. You will see the New Member Sign Up page. 3. Enter your [employer] Access Code exactly as it appears below. You will not need to use this code after youve completed the sign-up process. If you do not sign up before the expiration date, you must request a new code. [employer] Access Code: Activation code not generated Current [employer] Status: Active 4. Enter the last four digits of your Social Security Number (xxxx) and Date of Birth (mm/dd/yyyy) as indicated and click Submit. You will be taken to the next sign-up page. 5. Create a [employer] ID. This will be your [employer] login ID and cannot be changed, so think of one that is secure and easy to remember. 6. Create a [employer] password. You can change your password at any time. 7. Enter your Password Reset Question and Answer. This can be used at a later time if you forget your password. 8. Enter your e-mail address. You will receive e-mail notification when new information is available in [address] 9. Click Sign Up. You can now view your medical record. Additional Information Remember, MyChart is NOT to be used for urgent needs. For medical emergencies, dial 911. Now available from your iPhone and Android! DISCHARGE SUMMARY from Nurse The following personal items are in your possession at time of discharge: 
 
Dental Appliances: None Visual Aid: Glasses, Sent home Hearing Aids/Status: Does not own Home Medications: None Jewelry: None Clothing: Shirt, Socks, Undergarments, Footwear, Pants (placed in locker) Other Valuables: Purse (given to ) PATIENT INSTRUCTIONS: 
 
After general anesthesia or intravenous sedation, for 24 hours or while taking prescription Narcotics: · Limit your activities · Do not drive and operate hazardous machinery · Do not make important personal or business decisions · Do  not drink alcoholic beverages · If you have not urinated within 8 hours after discharge, please contact your surgeon on call. Report the following to your surgeon: 
· Excessive pain, swelling, redness or odor of or around the surgical area · Temperature over 100.5 · Nausea and vomiting lasting longer than 4 hours or if unable to take medications · Any signs of decreased circulation or nerve impairment to extremity: change in color, persistent  numbness, tingling, coldness or increase pain · Any questions These are general instructions for a healthy lifestyle: No smoking/ No tobacco products/ Avoid exposure to second hand smoke Surgeon General's Warning:  Quitting smoking now greatly reduces serious risk to your health. Obesity, smoking, and sedentary lifestyle greatly increases your risk for illness A healthy diet, regular physical exercise & weight monitoring are important for maintaining a healthy lifestyle You may be retaining fluid if you have a history of heart failure or if you experience any of the following symptoms:  Weight gain of 3 pounds or more overnight or 5 pounds in a week, increased swelling in our hands or feet or shortness of breath while lying flat in bed. Please call your doctor as soon as you notice any of these symptoms; do not wait until your next office visit. Recognize signs and symptoms of STROKE: 
 
F-face looks uneven A-arms unable to move or move unevenly S-speech slurred or non-existent T-time-call 911 as soon as signs and symptoms begin-DO NOT go Back to bed or wait to see if you get better-TIME IS BRAIN. Warning Signs of HEART ATTACK Call 911 if you have these symptoms: 
? Chest discomfort. Most heart attacks involve discomfort in the center of the chest that lasts more than a few minutes, or that goes away and comes back. It can feel like uncomfortable pressure, squeezing, fullness, or pain. ? Discomfort in other areas of the upper body. Symptoms can include pain or discomfort in one or both arms, the back, neck, jaw, or stomach. ? Shortness of breath with or without chest discomfort. ? Other signs may include breaking out in a cold sweat, nausea, or lightheadedness. Don't wait more than five minutes to call [address]! Fast action can save your life. Calling 911 is almost always the fastest way to get lifesaving treatment. Emergency Medical Services staff can begin treatment when they arrive  up to an hour sooner than if someone gets to the hospital by car. The discharge information has been reviewed with the patient and spouse. The patient and spouse verbalized understanding. Discharge medications reviewed with the patient and spouse and appropriate educational materials and side effects teaching were provided. TVT 
Tension free Vaginal Tape Procedure-specific postoperative instructions General Instructions Make an appointment to come back to the office in about 2-3 weeks. Eat and drink your normal diet. Take laxatives as needed. Call us if you have questions or problems. Bladder care Your bladder may be irritable following this surgery, so you may need to go more frequently or urgently than before. Please be patient, this will usually go away within the first six weeks without treatment. It is possible, however to get a bladder infection after surgery. If it is burning as you urinate, symptoms are getting worse, or you see blood in your urine consistently, call us. Otherwise, it is important that you empty your bladder when you go. You may need to take extra time or even press gently above your pubic bone as you urinate to help your bladder empty. What to expect You have had minor surgery through a tiny incision in the front of the vagina. The most important factor after this surgery to anchor the bladder neck back in place is to not tear it loose. That means no significant lifting for 6 weeks. After that healing is complete and you can resume normal activity. The chances of undoing the surgery are very small, but not zero. You can expect some pulling or sharp sensations. It is not likely to be severe and other than lifting heavy objects or falling you will not damage this repair doing daily activities. As always, if you feel like something is amiss, call the office. Discharge Orders None Introducing [facility] SERVICES! Dear Renee Robertson: 
Thank you for requesting a [employer] account. Our records indicate that you already have an active [employer] account. You can access your account anytime at [email or web address] Did you know that you can access your hospital and ER discharge instructions at any time in [employer]?   You can also review all of your test results from your hospital stay or ER visit. Additional Information If you have questions, please visit the Frequently Asked Questions section of the [facility] website at [email or web address] Remember, MyChart is NOT to be used for urgent needs. For medical emergencies, dial 911. Now available from your iPhone and Android! General Information Please provide this summary of care documentation to your next provider. Patient Signature:  ____________________________________________________________ Date:  ____________________________________________________________  
  
Ksenia Burn Provider Signature:  ____________________________________________________________ Date:  ____________________________________________________________

## 2017-05-25 NOTE — ANESTHESIA PREPROCEDURE EVALUATION
Anesthetic History   No history of anesthetic complications            Review of Systems / Medical History  Patient summary reviewed, nursing notes reviewed and pertinent labs reviewed    Pulmonary  Within defined limits                 Neuro/Psych   Within defined limits      Headaches     Cardiovascular  Within defined limits  Hypertension              Exercise tolerance: >4 METS     GI/Hepatic/Renal  Within defined limits              Endo/Other  Within defined limits           Other Findings   Comments: Raynaud  Hx of emergence delirium           Physical Exam    Airway  Mallampati: III    Neck ROM: normal range of motion   Mouth opening: Normal     Cardiovascular  Regular rate and rhythm,  S1 and S2 normal,  no murmur, click, rub, or gallop  Rhythm: regular  Rate: normal         Dental  No notable dental hx       Pulmonary  Breath sounds clear to auscultation               Abdominal  GI exam deferred       Other Findings            Anesthetic Plan    ASA: 2  Anesthesia type: general          Induction: Intravenous  Anesthetic plan and risks discussed with: Patient

## 2017-05-25 NOTE — ANESTHESIA POSTPROCEDURE EVALUATION
Post-Anesthesia Evaluation and Assessment    Patient: Carol Sotelo MRN: [id number]  SSN: [id number]    YOB: 1970  Age: 48 y.o. Sex: female       Cardiovascular Function/Vital Signs  Visit Vitals    /61    Pulse 70    Temp 36.4 °C (97.6 °F)    Resp 16    SpO2 95%       Patient is status post general anesthesia for Procedure(s):  TENSION FREE VAGINAL TAPING OBTURATOR ;CYSTOSCOPY. Nausea/Vomiting: None    Postoperative hydration reviewed and adequate. Pain:  Pain Scale 1: Numeric (0 - 10) (05/25/17 0850)  Pain Intensity 1: 4 (05/25/17 0850)   Managed    Neurological Status:   Neuro (WDL): Within Defined Limits (05/25/17 0850)   At baseline    Mental Status and Level of Consciousness: Arousable    Pulmonary Status:   O2 Device: Nasal cannula (05/25/17 0850)   Adequate oxygenation and airway patent    Complications related to anesthesia: None    Post-anesthesia assessment completed.  No concerns    Signed By: Karla Tavares MD     May 25, 2017

## 2017-06-08 ENCOUNTER — OFFICE VISIT (OUTPATIENT)
Dept: OBGYN CLINIC | Age: 47
End: 2017-06-08

## 2017-06-08 VITALS
WEIGHT: 179 LBS | DIASTOLIC BLOOD PRESSURE: 68 MMHG | SYSTOLIC BLOOD PRESSURE: 110 MMHG | BODY MASS INDEX: 31.71 KG/M2 | HEIGHT: 63 IN

## 2017-06-08 DIAGNOSIS — Z09 POSTOP CHECK: Primary | ICD-10-CM

## 2017-06-08 NOTE — PROGRESS NOTES
Postop TVT Evaluation  Magaly Deng is a 60 y.o. female returns for a routine post-operative follow-up visit after undergoing a TVT which was done 2 weeks ago. She had no pathology done. Since the patient's surgery, she has had typical postoperative discomfort but no significant symptoms or problems since the surgery. The patient has no significant vaginal drainage or bleeding. She states since the procedure, she has returned to full daily activities, ambulating, and not lifting or exercising. PHYSICAL EXAMINATION    Gastrointestinal  · Abdominal Examination: abdomen non-tender to palpation, normal bowel sounds, no masses present  · Liver and spleen: no hepatomegaly present, spleen not palpable  · Hernias: no hernias identified    Genitourinary  · External Genitalia: normal appearance for age, no discharge present, no tenderness present, no inflammatory lesions present, no masses present, no atrophy present  · Vagina: well supported vaginal vault with no exposure of mesh, TVT intact--slightly bridged across anterior vagina, but well placed, suture intact with no significant discharge present, no inflammatory lesions present, no masses present  · Bladder: non-tender to palpation  · Urethra: appears normal  · Cervix: normal   · Uterus: absent  · Adnexa: no adnexal tenderness present, no adnexal masses present  · Perineum: perineum within normal limits, no evidence of trauma, no rashes or skin lesions present  Skin  · General Inspection: no rash, no lesions identified    Neurologic/Psychiatric  · Mental Status:  · Orientation: grossly oriented to person, place and time  · Mood and Affect: mood normal, affect appropriate    Assessment:  Normal postop checkup TVT    Plan:  RTO for AE and mammo.

## 2017-07-03 ENCOUNTER — OFFICE VISIT (OUTPATIENT)
Dept: OBGYN CLINIC | Age: 47
End: 2017-07-03

## 2017-07-03 VITALS
SYSTOLIC BLOOD PRESSURE: 130 MMHG | WEIGHT: 179 LBS | DIASTOLIC BLOOD PRESSURE: 84 MMHG | BODY MASS INDEX: 31.71 KG/M2 | HEIGHT: 63 IN

## 2017-07-03 DIAGNOSIS — N95.1 MENOPAUSAL SYNDROME: ICD-10-CM

## 2017-07-03 DIAGNOSIS — Z01.419 ENCOUNTER FOR GYNECOLOGICAL EXAMINATION WITHOUT ABNORMAL FINDING: Primary | ICD-10-CM

## 2017-07-03 RX ORDER — LOSARTAN POTASSIUM 100 MG/1
12.5 TABLET ORAL DAILY
COMMUNITY

## 2017-07-03 RX ORDER — ESTRADIOL 2 MG/1
2 TABLET ORAL DAILY
Qty: 90 TAB | Refills: 4 | Status: SHIPPED | OUTPATIENT
Start: 2017-07-03 | End: 2018-09-14 | Stop reason: SDUPTHER

## 2017-07-03 NOTE — PROGRESS NOTES
Annual exam/Problem visit/ ages 38-59 post hysterectomy    Alona Rizo is a ,  48 y.o. female [facility] [place] Patient's last menstrual period was 2014. .    She presents for her annual checkup. She is having significant hot flashes--see below. With regard to the Gardasil vaccine, she is older than the age for which it is FDA approved. Hormonal status:  She reports no perimenstrual type symptoms. She is not having vasomotor symptoms. The patient is not using any ERT. Sexual history:    She  reports that she currently engages in sexual activity and has had male partners. She reports using the following method of birth control/protection: Surgical.    Medical conditions:    Since her last annual GYN exam about one year ago, she has not the following changes in her health history: none. Surgical history confirmed with patient. has a past surgical history that includes laparoscopic supracervical hysterectomy (14); urological (2017); cholecystectomy (); tubal ligation (); hysterectomy (); orthopaedic (Left, 2016); and urological (2017). Pap and Mammogram History:    Her most recent Pap smear was normal, obtained 1 year(s) ago. The patient had her mammogram today in our office. Breast Cancer History/Substance Abuse: negative    Osteoporosis History:    Family history does not include a first or second degree relative with osteopenia or osteoporosis. A bone density scan has not been obtained. .    Past Medical History:   Diagnosis Date    Autoimmune disease ([facility] [address]     Raynaud    Encounter for insertion of mirena IUD     pt states replaced in 2013    Encounter for IUD removal 14    Hypertension     Ill-defined condition     migraines    Migraine     LAST HEADACHE 14    Unspecified adverse effect of anesthesia     pt states freaks out upon awaking, will try to get up     Past Surgical History:   Procedure Laterality Date    HX CHOLECYSTECTOMY  2012    HX HYSTERECTOMY  2015    HX LAPAROSCOPIC SUPRACERVICAL HYSTERECTOMY  7/21/14    Mirena Removal    HX ORTHOPAEDIC Left 2016    CTR    HX TUBAL LIGATION  1993    HX UROLOGICAL  05/19/2017    Urodynamics    HX UROLOGICAL  05/25/2017    TVT-O, Cystoscopy       Current Outpatient Prescriptions   Medication Sig Dispense Refill    losartan (COZAAR) 100 mg tablet Take 12.5 mg by mouth daily.  hydroCHLOROthiazide (HYDRODIURIL) 12.5 mg tablet Take 12.5 mg by mouth daily.  rizatriptan (MAXALT) 10 mg tablet Take 10 mg by mouth once as needed for Migraine. May repeat in 2 hours if needed      estradiol (ESTRACE) 0.5 mg tablet TAKE 1 TABLET BY MOUTH DAILY 90 Tab 0    cyclobenzaprine (FLEXERIL) 10 mg tablet daily as needed. 0    HYDROcodone-acetaminophen (NORCO) 7.5-325 mg per tablet Take 1 Tab by mouth every six (6) hours as needed for Pain. Max Daily Amount: 4 Tabs. 24 Tab 0    LOSARTAN PO Take 12.5 mg by mouth daily.  cephALEXin (KEFLEX) 500 mg capsule Take 500 mg by mouth four (4) times daily. Allergies: Doxycycline     Tobacco History:  reports that she has never smoked. She has never used smokeless tobacco.  Alcohol Abuse:  reports that she drinks alcohol. Drug Abuse:  reports that she does not use illicit drugs.     Family Medical/Cancer History:   Family History   Problem Relation Age of Onset    Endometriosis Mother     Lung Disease Father     Endometriosis Maternal Grandmother         Review of Systems - History obtained from the patient  Constitutional: negative for weight loss, fever, night sweats  HEENT: negative for hearing loss, earache, congestion, snoring, sorethroat  CV: negative for chest pain, palpitations, edema  Resp: negative for cough, shortness of breath, wheezing  GI: negative for change in bowel habits, abdominal pain, black or bloody stools  : negative for frequency, dysuria, hematuria, vaginal discharge  MSK: negative for back pain, joint pain, muscle pain  Breast: negative for breast lumps, nipple discharge, galactorrhea  Skin :negative for itching, rash, hives  Neuro: negative for dizziness, headache, confusion, weakness  Psych: negative for anxiety, depression, change in mood  Heme/lymph: negative for bleeding, bruising, pallor    Physical Exam    Visit Vitals    /84    Ht 5' 3\" (1.6 m)    Wt 179 lb (81.2 kg)    LMP 05/20/2014    BMI 31.71 kg/m2     Constitutional  · Appearance: well-nourished, well developed, alert, in no acute distress    HENT  · Head and Face: appears normal    Neck  · Inspection/Palpation: normal appearance, no masses or tenderness  · Lymph Nodes: no lymphadenopathy present  · Thyroid: gland size normal, nontender, no nodules or masses present on palpation    Chest  · Respiratory Effort: breathing unlabored  · Auscultation: normal breath sounds    Cardiovascular  · Heart:  · Auscultation: regular rate and rhythm without murmur    Breasts  · Inspection of Breasts: breasts symmetrical, no skin changes, no discharge present, nipple appearance normal, no skin retraction present  · Palpation of Breasts and Axillae: no masses present on palpation, no breast tenderness  · Axillary Lymph Nodes: no lymphadenopathy present    Gastrointestinal  · Abdominal Examination: abdomen non-tender to palpation, normal bowel sounds, no masses present  · Liver and spleen: no hepatomegaly present, spleen not palpable  · Hernias: no hernias identified    Genitourinary  · External Genitalia: normal appearance for age, no discharge present, no tenderness present, no inflammatory lesions present, no masses present, no atrophy present  · Vagina: TVT like a band across anterior vagina but intact and non-tender, otherwise normal vaginal vault without central or paravaginal defects, no discharge present, no inflammatory lesions present, no masses present  · Bladder: non-tender to palpation  · Urethra: appears normal  · Cervix: normal  · Uterus: absent  · Adnexa: no adnexal tenderness present, no adnexal masses present  · Perineum: perineum within normal limits, no evidence of trauma, no rashes or skin lesions present  · Anus: anus within normal limits, no hemorrhoids present  · Inguinal Lymph Nodes: no lymphadenopathy present    Skin  · General Inspection: no rash, no lesions identified    Neurologic/Psychiatric  · Mental Status:  · Orientation: grossly oriented to person, place and time  · Mood and Affect: mood normal, affect appropriate    Assessment:  Routine gynecologic examination  Her overall medical status is satisfactory except for gynecologic issues as below. Plan:  Counseled re: diet, exercise, healthy lifestyle  Return for yearly wellness visits  Rec annual mammogram    Problem visit    Subjective:  She is having significant hot flashes on 0.5 Estrace. [place] [facility] [place]. Low dose of Estrace was helping. Now worsening symptoms. Also some questions about the TVT. USI is 90% better. Has not had IC or exercised yet. Objective:  See above    Assessment:  Needs more estrogen  TVT working although somewhat unusual position on healing    Plan:  Rx Estrace 2 mg, reduce prn breast tenderness  May resume all normal activities.

## 2018-09-13 ENCOUNTER — HOSPITAL ENCOUNTER (EMERGENCY)
Age: 48
Discharge: HOME | End: 2018-09-14
Attending: EMERGENCY MEDICINE
Payer: COMMERCIAL

## 2018-09-13 DIAGNOSIS — S09.90XA CHI (CLOSED HEAD INJURY), INITIAL ENCOUNTER: ICD-10-CM

## 2018-09-13 DIAGNOSIS — S01.81XA FACIAL LACERATION, INITIAL ENCOUNTER: ICD-10-CM

## 2018-09-13 DIAGNOSIS — R55 SYNCOPE AND COLLAPSE: Primary | ICD-10-CM

## 2018-09-13 PROCEDURE — 99284 EMERGENCY DEPT VISIT MOD MDM: CPT

## 2018-09-13 PROCEDURE — 77030039266 HC ADH SKN EXOFIN S2SG -A

## 2018-09-13 NOTE — Clinical Note
NO DRIVING UNTIL FURTHER NOTICE. WE DO NOT KNOW WHY YOU FAINTED TONIGHT. CERTAINLY ALCOHOL PLAYED A FACTOR. CALL YOUR DOCTOR LATER TODAY WHEN THE OFFICE OPENS FOR A FOLLOW UP APPOINTMENT.

## 2018-09-14 ENCOUNTER — APPOINTMENT (OUTPATIENT)
Dept: CT IMAGING | Age: 48
End: 2018-09-14
Attending: EMERGENCY MEDICINE
Payer: COMMERCIAL

## 2018-09-14 VITALS
WEIGHT: 170 LBS | SYSTOLIC BLOOD PRESSURE: 122 MMHG | DIASTOLIC BLOOD PRESSURE: 89 MMHG | OXYGEN SATURATION: 93 % | TEMPERATURE: 97.5 F | RESPIRATION RATE: 22 BRPM | HEIGHT: 63 IN | HEART RATE: 98 BPM | BODY MASS INDEX: 30.12 KG/M2

## 2018-09-14 LAB
ALBUMIN SERPL-MCNC: 3.9 G/DL (ref 3.5–5)
ALBUMIN/GLOB SERPL: 1.1 {RATIO} (ref 1.1–2.2)
ALP SERPL-CCNC: 50 U/L (ref 45–117)
ALT SERPL-CCNC: 37 U/L (ref 12–78)
ANION GAP SERPL CALC-SCNC: 13 MMOL/L (ref 5–15)
AST SERPL-CCNC: 23 U/L (ref 15–37)
ATRIAL RATE: 90 BPM
BASOPHILS # BLD: 0 K/UL (ref 0–0.1)
BASOPHILS NFR BLD: 1 % (ref 0–1)
BILIRUB SERPL-MCNC: 0.2 MG/DL (ref 0.2–1)
BUN SERPL-MCNC: 12 MG/DL (ref 6–20)
BUN/CREAT SERPL: 13 (ref 12–20)
CALCIUM SERPL-MCNC: 8.6 MG/DL (ref 8.5–10.1)
CALCULATED P AXIS, ECG09: 21 DEGREES
CALCULATED R AXIS, ECG10: 42 DEGREES
CALCULATED T AXIS, ECG11: 1 DEGREES
CHLORIDE SERPL-SCNC: 105 MMOL/L (ref 97–108)
CO2 SERPL-SCNC: 24 MMOL/L (ref 21–32)
COMMENT, HOLDF: NORMAL
CREAT SERPL-MCNC: 0.91 MG/DL (ref 0.55–1.02)
D DIMER PPP FEU-MCNC: 0.29 MG/L FEU (ref 0–0.65)
DIAGNOSIS, 93000: NORMAL
DIFFERENTIAL METHOD BLD: NORMAL
EOSINOPHIL # BLD: 0.3 K/UL (ref 0–0.4)
EOSINOPHIL NFR BLD: 5 % (ref 0–7)
ERYTHROCYTE [DISTWIDTH] IN BLOOD BY AUTOMATED COUNT: 12.7 % (ref 11.5–14.5)
ETHANOL SERPL-MCNC: 257 MG/DL
GLOBULIN SER CALC-MCNC: 3.5 G/DL (ref 2–4)
GLUCOSE SERPL-MCNC: 94 MG/DL (ref 65–100)
HCT VFR BLD AUTO: 40.9 % (ref 35–47)
HGB BLD-MCNC: 13.9 G/DL (ref 11.5–16)
IMM GRANULOCYTES # BLD: 0 K/UL (ref 0–0.04)
IMM GRANULOCYTES NFR BLD AUTO: 0 % (ref 0–0.5)
LYMPHOCYTES # BLD: 3.1 K/UL (ref 0.8–3.5)
LYMPHOCYTES NFR BLD: 49 % (ref 12–49)
MCH RBC QN AUTO: 30.1 PG (ref 26–34)
MCHC RBC AUTO-ENTMCNC: 34 G/DL (ref 30–36.5)
MCV RBC AUTO: 88.5 FL (ref 80–99)
MONOCYTES # BLD: 0.4 K/UL (ref 0–1)
MONOCYTES NFR BLD: 6 % (ref 5–13)
NEUTS SEG # BLD: 2.5 K/UL (ref 1.8–8)
NEUTS SEG NFR BLD: 39 % (ref 32–75)
NRBC # BLD: 0 K/UL (ref 0–0.01)
NRBC BLD-RTO: 0 PER 100 WBC
P-R INTERVAL, ECG05: 172 MS
PLATELET # BLD AUTO: 189 K/UL (ref 150–400)
PMV BLD AUTO: 10.1 FL (ref 8.9–12.9)
POTASSIUM SERPL-SCNC: 3.5 MMOL/L (ref 3.5–5.1)
PROT SERPL-MCNC: 7.4 G/DL (ref 6.4–8.2)
Q-T INTERVAL, ECG07: 372 MS
QRS DURATION, ECG06: 82 MS
QTC CALCULATION (BEZET), ECG08: 455 MS
RBC # BLD AUTO: 4.62 M/UL (ref 3.8–5.2)
SAMPLES BEING HELD,HOLD: NORMAL
SODIUM SERPL-SCNC: 142 MMOL/L (ref 136–145)
TROPONIN I SERPL-MCNC: <0.05 NG/ML
VENTRICULAR RATE, ECG03: 90 BPM
WBC # BLD AUTO: 6.4 K/UL (ref 3.6–11)

## 2018-09-14 PROCEDURE — 70450 CT HEAD/BRAIN W/O DYE: CPT

## 2018-09-14 PROCEDURE — 77030010507 HC ADH SKN DERMBND J&J -B

## 2018-09-14 PROCEDURE — 93005 ELECTROCARDIOGRAM TRACING: CPT

## 2018-09-14 PROCEDURE — 80053 COMPREHEN METABOLIC PANEL: CPT | Performed by: EMERGENCY MEDICINE

## 2018-09-14 PROCEDURE — 84484 ASSAY OF TROPONIN QUANT: CPT | Performed by: EMERGENCY MEDICINE

## 2018-09-14 PROCEDURE — 75810000293 HC SIMP/SUPERF WND  RPR

## 2018-09-14 PROCEDURE — 72125 CT NECK SPINE W/O DYE: CPT

## 2018-09-14 PROCEDURE — 85379 FIBRIN DEGRADATION QUANT: CPT | Performed by: EMERGENCY MEDICINE

## 2018-09-14 PROCEDURE — 36415 COLL VENOUS BLD VENIPUNCTURE: CPT | Performed by: EMERGENCY MEDICINE

## 2018-09-14 PROCEDURE — 80307 DRUG TEST PRSMV CHEM ANLYZR: CPT | Performed by: EMERGENCY MEDICINE

## 2018-09-14 PROCEDURE — 85025 COMPLETE CBC W/AUTO DIFF WBC: CPT | Performed by: EMERGENCY MEDICINE

## 2018-09-14 RX ORDER — BACITRACIN 500 UNIT/G
1 PACKET (EA) TOPICAL
Status: DISCONTINUED | OUTPATIENT
Start: 2018-09-14 | End: 2018-09-14

## 2018-09-14 RX ORDER — LIDOCAINE HYDROCHLORIDE AND EPINEPHRINE 10; 10 MG/ML; UG/ML
1.5 INJECTION, SOLUTION INFILTRATION; PERINEURAL ONCE
Status: DISCONTINUED | OUTPATIENT
Start: 2018-09-14 | End: 2018-09-14

## 2018-09-14 NOTE — ED PROVIDER NOTES
HPI Comments: 46 y.o. female with past medical history significant for HTN, migraine headaches, Raynaud's, status post hysterectomy, who presents to the ED accompanied by  and son, for evaluation of a head injury and possible syncopal episode. Patient states that she was speaking on the phone with her mother prior to arrival, and then the next thing she remembers is that she was sitting on the floor covered in her own blood. Denies any palpitations, chest pain, shortness of breath, weakness, lightheadedness, dizziness, or incontinence associated with this incident. Patient's son and  were both home when this incident occurred, although they did not witness it. Son stats that he was upstairs in his room when he heard a \"thud\" downstairs. He immediately went downstairs to investigate and found the patient sitting cross-legged on the floor, and there was blood covering her face and chest stemming from a wound to the bridge of the nose. Patient was awake and able to speak when son found her, and there was no witnessed loss of consciousness. Son went upstairs to wake up patient's , and they both brought her to the ED for further evaluation. Patient complains of some nausea at this time, but denies any pain. She denies any history of syncope, but reports that she has gotten lightheaded at the sight of blood in the past. Also notes that she only ate one meal today (chicken nuggets for dinner) when she usually eats three. Patient admits to EtOH intake tonight. At first states that she only had two glasses of wine, but later in the interview admits that she likely had closer to ~1.5 bottles of wine. Denies taking pain medications or other medications that would cause an adverse reaction with alcohol, but admits that she has been under a significant amount of stress at work.  Patient states that she has been seeing a counselor to talk about recent life stressors; the last visit was earlier today. She also believes that she has a history of DVT in the right leg, but is unsure if she was ever treated with anticoagulation therapy. Notes that she is currently on HRT (estradiol), but denies tobacco abuse. Patient specifically denies vomiting, diarrhea, abdominal pain, pain in the extremities, numbness, fevers, hemoptysis, cough, rhinorrhea, or other recent cold-like symptoms. There are no other acute medical concerns at this time. Social hx: Negative for Tobacco use; Positive for EtOH use (3-4 drinks per week); Negative for Illicit Drug Abuse. Patient works in IT. PCP: Gume Manning MD 
 
Note written by Citlaly Meneses, as dictated by Akbar Trent DO 11:54 PM  
 
The history is provided by the patient and a relative. No  was used. Past Medical History:  
Diagnosis Date  Autoimmune disease ([facility] [place] [address] Raynaud  Encounter for insertion of mirena IUD   
 pt states replaced in March 2013  Encounter for IUD removal 7/21/14  Hypertension  Ill-defined condition   
 migraines  Migraine LAST HEADACHE 6-30-14  Unspecified adverse effect of anesthesia   
 pt states freaks out upon awaking, will try to get up Past Surgical History:  
Procedure Laterality Date  HX CHOLECYSTECTOMY  2012  HX HYSTERECTOMY  2015  HX LAPAROSCOPIC SUPRACERVICAL HYSTERECTOMY  7/21/14 Mirena Removal  
 HX ORTHOPAEDIC Left 2016 [address]  HX UROLOGICAL  05/19/2017 Urodynamics  HX UROLOGICAL  05/25/2017 TVT-O, Cystoscopy Family History:  
Problem Relation Age of Onset  Endometriosis Mother  Lung Disease Father  Endometriosis Maternal Grandmother Social History Social History  Marital status:  Spouse name: N/A  
 Number of children: N/A  
 Years of education: N/A Occupational History  Not on file. Social History Main Topics  Smoking status: Never Smoker  Smokeless tobacco: Never Used  Alcohol use Yes Comment: 3-4 DRINKS PER WEEK  Drug use: No  
 Sexual activity: Yes  
  Partners: Male Birth control/ protection: Surgical  
   Comment: Tubal Ligation//Vasectomy/Hysterectomy Other Topics Concern  Not on file Social History Narrative ALLERGIES: Doxycycline Review of Systems Constitutional: Negative for fever. HENT: Negative for rhinorrhea. Respiratory: Negative for cough and shortness of breath. Cardiovascular: Negative for chest pain and palpitations. Gastrointestinal: Positive for nausea. Negative for abdominal pain, diarrhea and vomiting. Genitourinary:  
     Negative for incontinence Musculoskeletal: Negative for myalgias. Skin: Positive for wound (bridge of nose). Neurological: Positive for syncope (?). Negative for dizziness, weakness, light-headedness and numbness. All other systems reviewed and are negative. Vitals:  
 09/13/18 2342 09/14/18 0030 09/14/18 0130 09/14/18 0200 BP: (!) 147/94 124/87 133/84 122/89 Pulse: 98 Resp: 22 Temp: 97.5 °F (36.4 °C) SpO2: 95% 95% 90% 93% Weight: 77.1 kg (170 lb) Height: 5' 3\" (1.6 m) Physical Exam  
 
  
Constitutional: Pt is awake and alert. Pt appears well-developed and well-nourished. NAD. HENT:  
Head: Normocephalic. Nose: There is a 0.5 cm laceration across the bridge of the nose. There is no crepitus, deformity, or septal hematoma. Mouth/Throat: Oropharynx is clear and moist. No oropharyngeal exudate. Eyes: Conjunctivae and extraocular motions are normal. Pupils are equal, round, and reactive to light. Right eye exhibits no discharge. Left eye exhibits no discharge. No scleral icterus. Neck: No tracheal deviation present. Supple neck.  
Cardiovascular: Tachycardic, regular rhythm, normal heart sounds and intact distal pulses. Exam reveals no gallop and no friction rub. No murmur heard. Pulmonary/Chest: Effort normal and breath sounds normal.  Pt  has no wheezes. Pt  has no rales. Abdominal: Soft. Pt  exhibits no distension and no mass. No tenderness. Pt  has no rebound and no guarding. Musculoskeletal:  Pt  exhibits no edema and no tenderness. Ext: Normal ROM in all four extremities; not tender to palpation; distal pulses are normal, no edema. Neurological:  Pt is alert. Normal motor and sensory function. nonfocal neuro exam. 
Skin: Skin is warm and dry. Pt  is not diaphoretic. Psychiatric:  Pt  has a normal mood and affect. Behavior is normal.  
 
Note written by Harjeet Gómez. Wojciech Zavala, as dictated by Judythe Duty, DO 12:04 AM 
 
 
MDM Number of Diagnoses or Management Options CHI (closed head injury), initial encounter:  
Facial laceration, initial encounter:  
Syncope and collapse:  
 
 
 
ED Course ED EKG interpretation: 
Rhythm: normal sinus rhythm; and regular . Rate (approx.): 97 bpm; Axis: normal; Intervals: Normal; ST/T wave: normal. There is some artifact present. Note written by Harjeet Gómez. Alesia Garcia, as dictated by Judythe Duty, DO 12:18 AM 
 
 
Wound Repair 
Date/Time: 9/14/2018 12:36 AM 
Performed by: attendingLocation details: nose Wound length:2.5 cm or less Skin closure: glue Patient tolerance: Patient tolerated the procedure well with no immediate complications My total time at bedside, performing this procedure was 1-15 minutes. ED EKG interpretation (repeat): 
Rhythm: normal sinus rhythm; and regular . Rate (approx.): 90 bpm; Axis: normal; Intervals: normal; ST/T wave: normal;   
Note written by Harjeet Gómez. Wojciech Zavala, as dictated by Judythe Duty, DO 12:45 AM 
  
2:26 AM 
Patient's results have been reviewed with them.   Patient and/or family have verbally conveyed their understanding and agreement of the patient's signs, symptoms, diagnosis, treatment and prognosis and additionally agree to follow up as recommended or return to the Emergency Room should their condition change prior to follow-up. Discharge instructions have also been provided to the patient with some educational information regarding their diagnosis as well a list of reasons why they would want to return to the ER prior to their follow-up appointment should their condition change. Plan is DC home Alcohol intake probably has something to do with events today. No si or hi. Talked with pt and  about alcohol intake. She has been under a lot of work stress and feels that she needs to be strong for her family as well. Says today's drinking \"got away from me. \"  She did have a syncopal episode though. No tongue bite. Doubt it was a sz. She is restricted from driving until further notice - discussed this with pt and . He will do all the driving. DC home improved. Recent Results (from the past 24 hour(s)) SAMPLES BEING HELD Collection Time: 09/14/18 12:12 AM  
Result Value Ref Range SAMPLES BEING HELD 1SST COMMENT Add-on orders for these samples will be processed based on acceptable specimen integrity and analyte stability, which may vary by analyte. CBC WITH AUTOMATED DIFF Collection Time: 09/14/18 12:12 AM  
Result Value Ref Range WBC 6.4 3.6 - 11.0 K/uL  
 RBC 4.62 3.80 - 5.20 M/uL  
 HGB 13.9 11.5 - 16.0 g/dL HCT 40.9 35.0 - 47.0 % MCV 88.5 80.0 - 99.0 FL  
 MCH 30.1 26.0 - 34.0 PG  
 MCHC 34.0 30.0 - 36.5 g/dL  
 RDW 12.7 11.5 - 14.5 % PLATELET 139 826 - 425 K/uL MPV 10.1 8.9 - 12.9 FL  
 NRBC 0.0 0  WBC ABSOLUTE NRBC 0.00 0.00 - 0.01 K/uL NEUTROPHILS 39 32 - 75 % LYMPHOCYTES 49 12 - 49 % MONOCYTES 6 5 - 13 % EOSINOPHILS 5 0 - 7 % BASOPHILS 1 0 - 1 % IMMATURE GRANULOCYTES 0 0.0 - 0.5 % ABS. NEUTROPHILS 2.5 1.8 - 8.0 K/UL  
 ABS. LYMPHOCYTES 3.1 0.8 - 3.5 K/UL ABS. MONOCYTES 0.4 0.0 - 1.0 K/UL  
 ABS. EOSINOPHILS 0.3 0.0 - 0.4 K/UL  
 ABS. BASOPHILS 0.0 0.0 - 0.1 K/UL  
 ABS. IMM. GRANS. 0.0 0.00 - 0.04 K/UL  
 DF AUTOMATED METABOLIC PANEL, COMPREHENSIVE Collection Time: 09/14/18 12:12 AM  
Result Value Ref Range Sodium 142 136 - 145 mmol/L Potassium 3.5 3.5 - 5.1 mmol/L Chloride 105 97 - 108 mmol/L  
 CO2 24 21 - 32 mmol/L Anion gap 13 5 - 15 mmol/L Glucose 94 65 - 100 mg/dL BUN 12 6 - 20 MG/DL Creatinine 0.91 0.55 - 1.02 MG/DL  
 BUN/Creatinine ratio 13 12 - 20 GFR est AA >60 >60 ml/min/1.73m2 GFR est non-AA >60 >60 ml/min/1.73m2 Calcium 8.6 8.5 - 10.1 MG/DL Bilirubin, total 0.2 0.2 - 1.0 MG/DL  
 ALT (SGPT) 37 12 - 78 U/L  
 AST (SGOT) 23 15 - 37 U/L Alk. phosphatase 50 45 - 117 U/L Protein, total 7.4 6.4 - 8.2 g/dL Albumin 3.9 3.5 - 5.0 g/dL Globulin 3.5 2.0 - 4.0 g/dL A-G Ratio 1.1 1.1 - 2.2 D DIMER Collection Time: 09/14/18 12:12 AM  
Result Value Ref Range D-dimer 0.29 0.00 - 0.65 mg/L FEU  
TROPONIN I Collection Time: 09/14/18 12:12 AM  
Result Value Ref Range Troponin-I, Qt. <0.05 <0.05 ng/mL ETHYL ALCOHOL Collection Time: 09/14/18 12:12 AM  
Result Value Ref Range ALCOHOL(ETHYL),SERUM 257 (H) <10 MG/DL  
EKG, 12 LEAD, INITIAL Collection Time: 09/14/18 12:12 AM  
Result Value Ref Range Ventricular Rate 97 BPM  
 Atrial Rate 291 BPM  
 QRS Duration 82 ms Q-T Interval 372 ms QTC Calculation (Bezet) 472 ms Calculated P Axis 25 degrees Calculated R Axis 43 degrees Calculated T Axis -4 degrees Diagnosis Atrial flutter with 3:1 AV conduction Nonspecific ST and T wave abnormality Abnormal ECG When compared with ECG of 19-MAY-2017 15:16, 
Atrial flutter has replaced Sinus rhythm Vent. rate has increased BY  39 BPM 
Nonspecific T wave abnormality, worse in Anterior leads QT has lengthened EKG, 12 LEAD, SUBSEQUENT  Collection Time: 09/14/18 12:44 AM  
 Result Value Ref Range Ventricular Rate 90 BPM  
 Atrial Rate 90 BPM  
 P-R Interval 172 ms QRS Duration 82 ms Q-T Interval 372 ms QTC Calculation (Bezet) 455 ms Calculated P Axis 21 degrees Calculated R Axis 42 degrees Calculated T Axis 1 degrees Diagnosis Normal sinus rhythm Normal ECG When compared with ECG of 19-MAY-2017 15:16, 
Vent.  rate has increased BY  32 BPM 
Confirmed by Bety SCHWARZ, Jose De Jesus Leavitt ([id number]) on 9/14/2018 10:11:09 AM

## 2018-09-14 NOTE — ED TRIAGE NOTES
Pt arrives with family, family states they think the pt fell and hit her head on the kitchen table. Pt states she does not remember what happened or if she felt dizzy prior to falling. Unsure if LOC. Laceration to nose bleeding at triage.

## 2018-09-14 NOTE — DISCHARGE INSTRUCTIONS
Cuts Closed With Adhesives: Care Instructions  Your Care Instructions  A cut can happen anywhere on your body. The doctor used an adhesive to close the cut. When the adhesive dries, it forms a film that holds the edges of the cut together. Skin adhesives are sometimes called liquid stitches. If the cut went deep and through the skin, the doctor may have put in a layer of stitches below the adhesive. The deeper layer of stitches brings the deep part of the cut together. These stitches will dissolve and don't need to be removed. You don't see the stitches, only the adhesive. You may have a bandage. The doctor has checked you carefully, but problems can develop later. If you notice any problems or new symptoms, get medical treatment right away. Follow-up care is a key part of your treatment and safety. Be sure to make and go to all appointments, and call your doctor if you are having problems. It's also a good idea to know your test results and keep a list of the medicines you take. How can you care for yourself at home? · Keep the cut dry for the first 24 to 48 hours. After this, you can shower if your doctor okays it. Pat the cut dry. · Don't soak the cut, such as in a bathtub. Your doctor will tell you when it's safe to get the cut wet. · If your doctor told you how to care for your cut, follow your doctor's instructions. If you did not get instructions, follow this general advice:  ¨ Do not put any kind of ointment, cream, or lotion over the area. This can make the adhesive fall off too soon. ¨ After the first 24 to 48 hours, wash around the cut with clean water 2 times a day. Do not use hydrogen peroxide or alcohol, which can slow healing. ¨ If the doctor told you to use a bandage, put on a new bandage after cleaning the cut or if the bandage gets wet or dirty. · Prop up the sore area on a pillow anytime you sit or lie down during the next 3 days. Try to keep it above the level of your heart. This will help reduce swelling. · Leave the skin adhesive on your skin until it falls off on its own. This may take 5 to 10 days. · Do not scratch, rub, or pick at the adhesive. · Do not put the sticky part of a bandage directly on the adhesive. · Avoid any activity that could cause your cut to reopen. · Be safe with medicines. Read and follow all instructions on the label. ¨ If the doctor gave you a prescription medicine for pain, take it as prescribed. ¨ If you are not taking a prescription pain medicine, ask your doctor if you can take an over-the-counter medicine. When should you call for help? Call your doctor now or seek immediate medical care if:    · You have new pain, or your pain gets worse.     · The skin near the cut is cold or pale or changes color.     · You have tingling, weakness, or numbness near the cut.     · The cut starts to bleed.     · You have trouble moving the area near the cut.     · You have symptoms of infection, such as:  ¨ Increased pain, swelling, warmth, or redness around the cut. ¨ Red streaks leading from the cut. ¨ Pus draining from the cut. ¨ A fever.    Watch closely for changes in your health, and be sure to contact your doctor if:    · The cut reopens.     · You do not get better as expected. Where can you learn more? Go to http://maritza-zelda.info/. Enter P174 in the search box to learn more about \"Cuts Closed With Adhesives: Care Instructions. \"  Current as of: November 20, 2017  Content Version: 11.7  © 0913-2266 Healthwise, Incorporated. Care instructions adapted under license by [employer] (which disclaims liability or warranty for this information). If you have questions about a medical condition or this instruction, always ask your healthcare professional. [place] [address] any warranty or liability for your use of this information.        Acute Alcohol Intoxication: Care Instructions  Your Care Instructions    You have had treatment to help your body rid itself of alcohol. Too much alcohol upsets the body's fluid balance. Your doctor may have given you fluids and vitamins. For some people, drinking too much alcohol is a one-time event. For others, it is an ongoing problem. In either case, it is serious. It can be life-threatening. Follow-up care is a key part of your treatment and safety. Be sure to make and go to all appointments, and call your doctor if you are having problems. It's also a good idea to know your test results and keep a list of the medicines you take. How can you care for yourself at home? · Do not drink and drive. · Be safe with medicines. Take your medicines exactly as prescribed. Call your doctor if you think you are having a problem with your medicine. · If you were given medicine to prevent nausea, be sure to take it exactly as prescribed. · Before you take any medicine, tell your doctor if:  ¨ You have had a bad reaction to any medicines in the past.  ¨ You are taking other medicines, including over-the-counter ones, or have other health problems. ¨ You are or could be pregnant. · Be prepared to have some symptoms of withdrawal in the next few days. · Drink plenty of liquids in the next few days. · Seek help if you need it to stop drinking. Getting counseling and joining a support group can help you stay sober. Try a support group such as Alcoholics Anonymous. · Avoid alcohol when you take medicines. It can react with many medicines and cause serious problems. When should you call for help? Call 911 anytime you think you may need emergency care.  For example, call if:    · You feel confused and are seeing things that are not there.     · You are thinking about killing yourself or hurting others.     · You have a seizure.     · You vomit blood or what looks like coffee grounds.    Call your doctor now or seek immediate medical care if:    · You have trembling, restlessness, sweating, and other withdrawal symptoms that are new or that get worse.     · Your withdrawal symptoms come back after not bothering you for days or weeks.     · You can't stop vomiting.    Watch closely for changes in your health, and be sure to contact your doctor if:    · You need help to stop drinking. Where can you learn more? Go to http://maritza-zelda.info/. Enter T102 in the search box to learn more about \"Acute Alcohol Intoxication: Care Instructions. \"  Current as of: October 9, 2017  Content Version: 11.7  © 2695-9670 [employer]. Care instructions adapted under license by [employer] (which disclaims liability or warranty for this information). If you have questions about a medical condition or this instruction, always ask your healthcare professional. [address] any warranty or liability for your use of this information. Fainting: Care Instructions  Your Care Instructions    When you faint, or pass out, you lose consciousness for a short time. A brief drop in blood flow to the brain often causes it. When you fall or lie down, more blood flows to your brain and you regain consciousness. Emotional stress, pain, or overheating-especially if you have been standing-can make you faint. In these cases, fainting is usually not serious. But fainting can be a sign of a more serious problem. Your doctor may want you to have more tests to rule out other causes. The treatment you need depends on the reason why you fainted. The doctor has checked you carefully, but problems can develop later. If you notice any problems or new symptoms, get medical treatment right away. Follow-up care is a key part of your treatment and safety. Be sure to make and go to all appointments, and call your doctor if you are having problems. It's also a good idea to know your test results and keep a list of the medicines you take.   How can you care for yourself at home? · Drink plenty of fluids to prevent dehydration. If you have kidney, heart, or liver disease and have to limit fluids, talk with your doctor before you increase your fluid intake. When should you call for help? Call 911 anytime you think you may need emergency care. For example, call if:    · You have symptoms of a heart problem. These may include:  ¨ Chest pain or pressure. ¨ Severe trouble breathing. ¨ A fast or irregular heartbeat. ¨ Lightheadedness or sudden weakness. ¨ Coughing up pink, foamy mucus. ¨ Passing out. After you call 911, the  may tell you to chew 1 adult-strength or 2 to 4 low-dose aspirin. Wait for an ambulance. Do not try to drive yourself.     · You have symptoms of a stroke. These may include:  ¨ Sudden numbness, tingling, weakness, or loss of movement in your face, arm, or leg, especially on only one side of your body. ¨ Sudden vision changes. ¨ Sudden trouble speaking. ¨ Sudden confusion or trouble understanding simple statements. ¨ Sudden problems with walking or balance. ¨ A sudden, severe headache that is different from past headaches.     · You passed out (lost consciousness) again.    Watch closely for changes in your health, and be sure to contact your doctor if:    · You do not get better as expected. Where can you learn more? Go to http://maritza-zelda.info/. Enter Y121 in the search box to learn more about \"Fainting: Care Instructions. \"  Current as of: November 20, 2017  Content Version: 11.7  © 0970-4493 Healthwise, Incorporated. Care instructions adapted under license by [employer] (which disclaims liability or warranty for this information). If you have questions about a medical condition or this instruction, always ask your healthcare professional. [address] any warranty or liability for your use of this information.

## 2018-09-15 LAB
ATRIAL RATE: 291 BPM
CALCULATED P AXIS, ECG09: 25 DEGREES
CALCULATED R AXIS, ECG10: 43 DEGREES
CALCULATED T AXIS, ECG11: -4 DEGREES
DIAGNOSIS, 93000: NORMAL
Q-T INTERVAL, ECG07: 372 MS
QRS DURATION, ECG06: 82 MS
QTC CALCULATION (BEZET), ECG08: 472 MS
VENTRICULAR RATE, ECG03: 97 BPM

## 2019-10-30 ENCOUNTER — TELEPHONE (OUTPATIENT)
Dept: OBGYN CLINIC | Age: 49
End: 2019-10-30

## 2019-10-30 RX ORDER — ESTRADIOL 2 MG/1
2 TABLET ORAL DAILY
Qty: 30 TAB | Refills: 0 | Status: SHIPPED | OUTPATIENT
Start: 2019-10-30 | End: 2021-09-08 | Stop reason: SDUPTHER

## 2019-10-30 NOTE — TELEPHONE ENCOUNTER
[facility]   Phone Number: [phone]             I have sent a request for an appointment via the appointment center for my pap and mammogram. I didn't even realize I hadn't been in since 2017. Is it possible to get a refill for now as I am having some hormonal issues at this time? If not I can go to my regular doctor if needed to have this done.    Thanks in advance   Ladjey Lewis      Please advise

## 2020-03-03 NOTE — PROGRESS NOTES
Annual exam ages 40-58 post hysterectomy    Og Huff is a ,  44 y.o. female [facility] Patient's last menstrual period was 2014. She presents for her annual checkup. She is having significant hot flashes and sweats for the last year. With regard to the Gardasil vaccine, she is older than the age for which it is FDA approved. Hormonal status:  She reports no perimenstrual type symptoms. She is not having vasomotor symptoms. The patient is not using any ERT. Sexual history:    She  reports being sexually active and has had partner(s) who are Male. She reports using the following method of birth control/protection: Surgical.    Medical conditions:    Since her last annual GYN exam about three or more years ago, she has not the following changes in her health history: none. Surgical history confirmed with patient. has a past surgical history that includes hx laparoscopic supracervical hysterectomy (14); hx urological (2017); hx cholecystectomy (); hx tubal ligation (); hx hysterectomy (); hx orthopaedic (Left, 2016); and hx urological (2017). Pap and Mammogram History:    Her most recent Pap smear was normal, obtained 4 year(s) ago. The patient has not had a recent mammogram.    Breast Cancer History/Substance Abuse: negative    Osteoporosis History:    Family history does not include a first or second degree relative with osteopenia or osteoporosis.     A bone density scan has not been obtained     Past Medical History:   Diagnosis Date    Autoimmune disease ([facility] [address]     Raynaud    Encounter for insertion of mirena IUD     pt states replaced in 2013    Encounter for IUD removal 14    Hypertension     Migraine     LAST HEADACHE 14    Unspecified adverse effect of anesthesia     pt states freaks out upon awaking, will try to get up     Past Surgical History:   Procedure Laterality Date    HX CHOLECYSTECTOMY      HX HYSTERECTOMY  2015    HX LAPAROSCOPIC SUPRACERVICAL HYSTERECTOMY  7/21/14    Mirena Removal    HX ORTHOPAEDIC Left 2016    CTR    HX TUBAL LIGATION  1993    HX UROLOGICAL  05/19/2017    Urodynamics    HX UROLOGICAL  05/25/2017    TVT-O, Cystoscopy       Current Outpatient Medications   Medication Sig Dispense Refill    estradiol (ESTRACE) 2 mg tablet Take 1 Tab by mouth daily. 30 Tab 0    losartan (COZAAR) 100 mg tablet Take 12.5 mg by mouth daily.  HYDROcodone-acetaminophen (NORCO) 7.5-325 mg per tablet Take 1 Tab by mouth every six (6) hours as needed for Pain. Max Daily Amount: 4 Tabs. 24 Tab 0    hydroCHLOROthiazide (HYDRODIURIL) 12.5 mg tablet Take 12.5 mg by mouth daily.  LOSARTAN PO Take 12.5 mg by mouth daily.  rizatriptan (MAXALT) 10 mg tablet Take 10 mg by mouth once as needed for Migraine. May repeat in 2 hours if needed      cephALEXin (KEFLEX) 500 mg capsule Take 500 mg by mouth four (4) times daily.  cyclobenzaprine (FLEXERIL) 10 mg tablet daily as needed. 0     Allergies: Doxycycline     Tobacco History:  reports that she has never smoked. She has never used smokeless tobacco.  Alcohol Abuse:  reports current alcohol use. Drug Abuse:  reports no history of drug use.     Family Medical/Cancer History:   Family History   Problem Relation Age of Onset    Endometriosis Mother     Lung Disease Father     Endometriosis Maternal Grandmother         Review of Systems - History obtained from the patient  Constitutional: negative for weight loss, fever, night sweats  HEENT: negative for hearing loss, earache, congestion, snoring, sorethroat  CV: negative for chest pain, palpitations, edema  Resp: negative for cough, shortness of breath, wheezing  GI: negative for change in bowel habits, abdominal pain, black or bloody stools  : negative for frequency, dysuria, hematuria, vaginal discharge  MSK: negative for back pain, joint pain, muscle pain  Breast: negative for breast lumps, nipple discharge, galactorrhea  Skin :negative for itching, rash, hives  Neuro: negative for dizziness, headache, confusion, weakness  Psych: negative for anxiety, depression, change in mood  Heme/lymph: negative for bleeding, bruising, pallor    Physical Exam    Visit Vitals  LMP 05/20/2014     Constitutional  · Appearance: well-nourished, well developed, alert, in no acute distress    HENT  · Head and Face: appears normal    Neck  · Inspection/Palpation: normal appearance, no masses or tenderness  · Lymph Nodes: no lymphadenopathy present  · Thyroid: gland size normal, nontender, no nodules or masses present on palpation    Chest  · Respiratory Effort: breathing unlabored  · Auscultation: normal breath sounds    Cardiovascular  · Heart:  · Auscultation: regular rate and rhythm without murmur    Breasts  · Inspection of Breasts: breasts symmetrical, no skin changes, no discharge present, nipple appearance normal, no skin retraction present  · Palpation of Breasts and Axillae: no masses present on palpation, no breast tenderness  · Axillary Lymph Nodes: no lymphadenopathy present    Gastrointestinal  · Abdominal Examination: abdomen non-tender to palpation, normal bowel sounds, no masses present  · Liver and spleen: no hepatomegaly present, spleen not palpable  · Hernias: no hernias identified    Genitourinary  · External Genitalia: normal appearance for age, no discharge present, no tenderness present, no inflammatory lesions present, no masses present, no atrophy present  · Vagina: normal vaginal vault without central or paravaginal defects, no discharge present, no inflammatory lesions present, no masses present  · Bladder: non-tender to palpation  · Urethra: appears normal  · Cervix: normal  · Uterus: absent  · Adnexa: no adnexal tenderness present, no adnexal masses present  · Perineum: perineum within normal limits, no evidence of trauma, no rashes or skin lesions present  · Anus: anus within normal limits, no hemorrhoids present  · Inguinal Lymph Nodes: no lymphadenopathy present    Skin  · General Inspection: no rash, no lesions identified    Neurologic/Psychiatric  · Mental Status:  · Orientation: grossly oriented to person, place and time  · Mood and Affect: mood normal, affect appropriate    Assessment:  Routine gynecologic examination  Her current medical status is satisfactory with no evidence of significant gynecologic issues except menopause. Wants to restart ERT in TD form.     Plan:  Counseled re: diet, exercise, healthy lifestyle  Return for yearly wellness visits  Rec annual mammogram

## 2020-03-06 ENCOUNTER — OFFICE VISIT (OUTPATIENT)
Dept: OBGYN CLINIC | Age: 50
End: 2020-03-06

## 2020-03-06 VITALS
BODY MASS INDEX: 35.61 KG/M2 | HEIGHT: 63 IN | SYSTOLIC BLOOD PRESSURE: 138 MMHG | DIASTOLIC BLOOD PRESSURE: 90 MMHG | WEIGHT: 201 LBS

## 2020-03-06 DIAGNOSIS — Z01.419 ENCOUNTER FOR ROUTINE GYNECOLOGICAL EXAMINATION WITH PAPANICOLAOU SMEAR OF CERVIX: Primary | ICD-10-CM

## 2020-03-06 DIAGNOSIS — E66.01 SEVERE OBESITY (HCC): ICD-10-CM

## 2020-03-06 RX ORDER — ESTRADIOL 0.1 MG/D
1 PATCH TRANSDERMAL
Qty: 12 PATCH | Refills: 3 | Status: SHIPPED | OUTPATIENT
Start: 2020-03-07 | End: 2021-09-08 | Stop reason: DRUGHIGH

## 2020-03-06 NOTE — PATIENT INSTRUCTIONS
Well Visit, Women 48 to 72: Care Instructions  Your Care Instructions    Physical exams can help you stay healthy. Your doctor has checked your overall health and may have suggested ways to take good care of yourself. He or she also may have recommended tests. At home, you can help prevent illness with healthy eating, regular exercise, and other steps. Follow-up care is a key part of your treatment and safety. Be sure to make and go to all appointments, and call your doctor if you are having problems. It's also a good idea to know your test results and keep a list of the medicines you take. How can you care for yourself at home? · Reach and stay at a healthy weight. This will lower your risk for many problems, such as obesity, diabetes, heart disease, and high blood pressure. · Get at least 30 minutes of exercise on most days of the week. Walking is a good choice. You also may want to do other activities, such as running, swimming, cycling, or playing tennis or team sports. · Do not smoke. Smoking can make health problems worse. If you need help quitting, talk to your doctor about stop-smoking programs and medicines. These can increase your chances of quitting for good. · Protect your skin from too much sun. When you're outdoors from 10 a.m. to 4 p.m., stay in the shade or cover up with clothing and a hat with a wide brim. Wear sunglasses that block UV rays. Even when it's cloudy, put broad-spectrum sunscreen (SPF 30 or higher) on any exposed skin. · See a dentist one or two times a year for checkups and to have your teeth cleaned. · Wear a seat belt in the car. Follow your doctor's advice about when to have certain tests. These tests can spot problems early. · Cholesterol. Your doctor will tell you how often to have this done based on your age, family history, or other things that can increase your risk for heart attack and stroke. · Blood pressure.  Have your blood pressure checked during a routine doctor visit. Your doctor will tell you how often to check your blood pressure based on your age, your blood pressure results, and other factors. · Mammogram. Ask your doctor how often you should have a mammogram, which is an X-ray of your breasts. A mammogram can spot breast cancer before it can be felt and when it is easiest to treat. · Pap test and pelvic exam. Ask your doctor how often you should have a Pap test. You may not need to have a Pap test as often as you used to. · Vision. Have your eyes checked every year or two or as often as your doctor suggests. Some experts recommend that you have yearly exams for glaucoma and other age-related eye problems starting at age 48. · Hearing. Tell your doctor if you notice any change in your hearing. You can have tests to find out how well you hear. · Diabetes. Ask your doctor whether you should have tests for diabetes. · Colorectal cancer. Your risk for colorectal cancer gets higher as you get older. Some experts say that adults should start regular screening at age 48 and stop at age 76. Others say to start before age 48 or continue after age 76. Talk with your doctor about your risk and when to start and stop screening. · Thyroid disease. Talk to your doctor about whether to have your thyroid checked as part of a regular physical exam. Women have an increased chance of a thyroid problem. · Osteoporosis. You should begin tests for bone density at age 72. If you are younger than 72, ask your doctor whether you have factors that may increase your risk for this disease. You may want to have this test before age 72. · Heart attack and stroke risk. At least every 4 to 6 years, you should have your risk for heart attack and stroke assessed. Your doctor uses factors such as your age, blood pressure, cholesterol, and whether you smoke or have diabetes to show what your risk for a heart attack or stroke is over the next 10 years.   When should you call for help?  Watch closely for changes in your health, and be sure to contact your doctor if you have any problems or symptoms that concern you. Where can you learn more? Go to http://maritza-zelda.info/. Enter R053 in the search box to learn more about \"Well Visit, Women 50 to 72: Care Instructions. \"  Current as of: December 13, 2018  Content Version: 12.2  © 8476-8687 [employer]. Care instructions adapted under license by [employer] (which disclaims liability or warranty for this information). If you have questions about a medical condition or this instruction, always ask your healthcare professional. [address] any warranty or liability for your use of this information.

## 2020-03-11 LAB
CYTOLOGIST CVX/VAG CYTO: NORMAL
CYTOLOGY CVX/VAG DOC CYTO: NORMAL
CYTOLOGY CVX/VAG DOC THIN PREP: NORMAL
CYTOLOGY HISTORY:: NORMAL
DX ICD CODE: NORMAL
HPV I/H RISK 1 DNA CVX QL PROBE+SIG AMP: NEGATIVE
Lab: NORMAL
OTHER STN SPEC: NORMAL
STAT OF ADQ CVX/VAG CYTO-IMP: NORMAL

## 2021-09-07 NOTE — PROGRESS NOTES
Annual exam ages 40-58 post hysterectomy    Cande Hughes is a ,  46 y.o. female [address] [facility] Patient's last menstrual period was 2014. She presents for her annual checkup. She is having pelvic pressure. With regard to the Gardasil vaccine, she is older than the age for which it is FDA approved. Hormonal status:  She reports no perimenstrual type symptoms. She is not having vasomotor symptoms. The patient is using an ERT-Climara patch    Sexual history:    She  reports being sexually active and has had partner(s) who are Male. She reports using the following method of birth control/protection: Surgical.    Medical conditions:    Since her last annual GYN exam about one year ago, she has not the following changes in her health history: none. Surgical history confirmed with patient. has a past surgical history that includes hx laparoscopic supracervical hysterectomy (12); hx urological (2017); hx cholecystectomy (); hx tubal ligation (); hx hysterectomy (); hx orthopaedic (Left, 2016); and hx urological (2017). Pap and Mammogram History:    Her most recent Pap smear was normal, obtained 1 year(s) ago. .    The patient had her mammogram today in our office. Breast Cancer History/Substance Abuse: negative    Osteoporosis History:    Family history does not include a first or second degree relative with osteopenia or osteoporosis.     A bone density scan has not been obtained     Past Medical History:   Diagnosis Date    Autoimmune disease ([facility] [address]     Raynaud    Encounter for insertion of mirena IUD     pt states replaced in 2013    Encounter for IUD removal 12    Hypertension     Migraine     LAST HEADACHE 14    Unspecified adverse effect of anesthesia     pt states freaks out upon awaking, will try to get up     Past Surgical History:   Procedure Laterality Date    HX CHOLECYSTECTOMY      HX HYSTERECTOMY      HX LAPAROSCOPIC SUPRACERVICAL HYSTERECTOMY  7/21/14    Mirena Removal    HX ORTHOPAEDIC Left 2016    CTR    HX TUBAL LIGATION  1993    HX UROLOGICAL  05/19/2017    Urodynamics    HX UROLOGICAL  05/25/2017    TVT-O, Cystoscopy       Current Outpatient Medications   Medication Sig Dispense Refill    losartan (COZAAR) 100 mg tablet Take 12.5 mg by mouth daily.  hydroCHLOROthiazide (HYDRODIURIL) 12.5 mg tablet Take 12.5 mg by mouth daily.  estradioL (CLIMARA) 0.1 mg/24 hr 1 Patch by TransDERmal route Every Saturday. (Patient not taking: Reported on 9/8/2021) 12 Patch 3    estradiol (ESTRACE) 2 mg tablet Take 1 Tab by mouth daily. (Patient not taking: Reported on 9/8/2021) 30 Tab 0    HYDROcodone-acetaminophen (NORCO) 7.5-325 mg per tablet Take 1 Tab by mouth every six (6) hours as needed for Pain. Max Daily Amount: 4 Tabs. (Patient not taking: Reported on 9/8/2021) 24 Tab 0    rizatriptan (MAXALT) 10 mg tablet Take 10 mg by mouth once as needed for Migraine. May repeat in 2 hours if needed (Patient not taking: Reported on 9/8/2021)      cephALEXin (KEFLEX) 500 mg capsule Take 500 mg by mouth four (4) times daily. (Patient not taking: Reported on 9/8/2021)      cyclobenzaprine (FLEXERIL) 10 mg tablet daily as needed. (Patient not taking: Reported on 9/8/2021)  0     Allergies: Doxycycline     Tobacco History:  reports that she has never smoked. She has never used smokeless tobacco.  Alcohol Abuse:  reports current alcohol use. Drug Abuse:  reports no history of drug use.     Family Medical/Cancer History:   Family History   Problem Relation Age of Onset    Endometriosis Mother     Lung Disease Father     Endometriosis Maternal Grandmother         Review of Systems - History obtained from the patient  Constitutional: negative for weight loss, fever, night sweats  HEENT: negative for hearing loss, earache, congestion, snoring, sorethroat  CV: negative for chest pain, palpitations, edema  Resp: negative for cough, shortness of breath, wheezing  GI: negative for change in bowel habits, abdominal pain, black or bloody stools  : negative for frequency, dysuria, hematuria, vaginal discharge  MSK: negative for back pain, joint pain, muscle pain  Breast: negative for breast lumps, nipple discharge, galactorrhea  Skin :negative for itching, rash, hives  Neuro: negative for dizziness, headache, confusion, weakness  Psych: negative for anxiety, depression, change in mood  Heme/lymph: negative for bleeding, bruising, pallor    Physical Exam    Visit Vitals  BP (!) 140/82   Wt 201 lb (91.2 kg)   LMP 05/20/2014   BMI 35.61 kg/m²     Constitutional  · Appearance: well-nourished, well developed, alert, in no acute distress    HENT  · Head and Face: appears normal    Neck  · Inspection/Palpation: normal appearance, no masses or tenderness  · Lymph Nodes: no lymphadenopathy present  · Thyroid: gland size normal, nontender, no nodules or masses present on palpation    Chest  · Respiratory Effort: breathing unlabored  · Auscultation: normal breath sounds    Cardiovascular  · Heart:  · Auscultation: regular rate and rhythm without murmur    Breasts  · Inspection of Breasts: breasts symmetrical, no skin changes, no discharge present, nipple appearance normal, no skin retraction present  · Palpation of Breasts and Axillae: no masses present on palpation, no breast tenderness  · Axillary Lymph Nodes: no lymphadenopathy present    Gastrointestinal  · Abdominal Examination: abdomen non-tender to palpation, normal bowel sounds, no masses present  · Liver and spleen: no hepatomegaly present, spleen not palpable  · Hernias: no hernias identified    Genitourinary  · External Genitalia: normal appearance for age, no discharge present, no tenderness present, no inflammatory lesions present, no masses present, no atrophy present  · Vagina: normal vaginal vault without central or paravaginal defects, no discharge present, no inflammatory lesions present, no masses present  · Bladder: non-tender to palpation  · Urethra: appears normal  · Cervix: normal  · Uterus: absent  · Adnexa: no adnexal tenderness present, no adnexal masses present  · Perineum: perineum within normal limits, no evidence of trauma, no rashes or skin lesions present  · Anus: anus within normal limits, no hemorrhoids present  · Inguinal Lymph Nodes: no lymphadenopathy present    Skin  · General Inspection: no rash, no lesions identified    Neurologic/Psychiatric  · Mental Status:  · Orientation: grossly oriented to person, place and time  · Mood and Affect: mood normal, affect appropriate    Assessment:  Routine gynecologic examination  Her current medical status is satisfactory with no evidence of significant gynecologic issues.     Plan:  Counseled re: diet, exercise, healthy lifestyle  Return for yearly wellness visits  Rec annual mammogram

## 2021-09-08 ENCOUNTER — OFFICE VISIT (OUTPATIENT)
Dept: OBGYN CLINIC | Age: 51
End: 2021-09-08

## 2021-09-08 VITALS — WEIGHT: 201 LBS | SYSTOLIC BLOOD PRESSURE: 140 MMHG | BODY MASS INDEX: 35.61 KG/M2 | DIASTOLIC BLOOD PRESSURE: 82 MMHG

## 2021-09-08 DIAGNOSIS — Z01.419 ENCOUNTER FOR ROUTINE GYNECOLOGICAL EXAMINATION WITH PAPANICOLAOU SMEAR OF CERVIX: Primary | ICD-10-CM

## 2021-09-08 PROCEDURE — 99396 PREV VISIT EST AGE 40-64: CPT | Performed by: OBSTETRICS & GYNECOLOGY

## 2021-09-08 RX ORDER — ESTRADIOL 2 MG/1
2 TABLET ORAL DAILY
Qty: 90 TABLET | Refills: 4 | Status: SHIPPED | OUTPATIENT
Start: 2021-09-08

## 2021-09-08 NOTE — PATIENT INSTRUCTIONS

## 2022-03-19 PROBLEM — E66.01 SEVERE OBESITY (HCC): Status: ACTIVE | Noted: 2020-03-06

## 2023-01-05 ENCOUNTER — TRANSCRIBE ORDER (OUTPATIENT)
Dept: REGISTRATION | Age: 53
End: 2023-01-05

## 2023-01-05 ENCOUNTER — HOSPITAL ENCOUNTER (OUTPATIENT)
Dept: MAMMOGRAPHY | Age: 53
Discharge: HOME | End: 2023-01-05
Attending: OBSTETRICS & GYNECOLOGY
Payer: COMMERCIAL

## 2023-01-05 DIAGNOSIS — Z12.31 OTHER SCREENING MAMMOGRAM: Primary | ICD-10-CM

## 2023-01-05 DIAGNOSIS — Z12.31 OTHER SCREENING MAMMOGRAM: ICD-10-CM

## 2023-01-05 PROCEDURE — 77063 BREAST TOMOSYNTHESIS BI: CPT

## 2023-05-23 RX ORDER — RIZATRIPTAN BENZOATE 10 MG/1
10 TABLET ORAL
COMMUNITY

## 2023-05-23 RX ORDER — HYDROCHLOROTHIAZIDE 12.5 MG/1
12.5 TABLET ORAL DAILY
COMMUNITY

## 2023-05-23 RX ORDER — CEPHALEXIN 500 MG/1
500 CAPSULE ORAL 4 TIMES DAILY
COMMUNITY

## 2023-05-23 RX ORDER — ESTRADIOL 2 MG/1
2 TABLET ORAL DAILY
COMMUNITY
Start: 2021-09-08

## 2023-05-23 RX ORDER — LOSARTAN POTASSIUM 100 MG/1
12.5 TABLET ORAL DAILY
COMMUNITY

## 2023-05-23 RX ORDER — CYCLOBENZAPRINE HCL 10 MG
TABLET ORAL DAILY PRN
COMMUNITY
Start: 2016-05-23

## 2023-05-23 RX ORDER — HYDROCODONE BITARTRATE AND ACETAMINOPHEN 7.5; 325 MG/1; MG/1
1 TABLET ORAL EVERY 6 HOURS PRN
COMMUNITY
Start: 2017-05-25

## 2025-02-05 ENCOUNTER — TRANSCRIBE ORDERS (OUTPATIENT)
Facility: HOSPITAL | Age: 55
End: 2025-02-05

## 2025-02-05 ENCOUNTER — HOSPITAL ENCOUNTER (OUTPATIENT)
Facility: HOSPITAL | Age: 55
Discharge: HOME | End: 2025-02-08
Payer: COMMERCIAL

## 2025-02-05 DIAGNOSIS — Z12.31 OTHER SCREENING MAMMOGRAM: ICD-10-CM

## 2025-02-05 DIAGNOSIS — Z12.31 OTHER SCREENING MAMMOGRAM: Primary | ICD-10-CM

## 2025-02-05 PROCEDURE — 77063 BREAST TOMOSYNTHESIS BI: CPT

## (undated) DEVICE — STERILE POLYISOPRENE POWDER-FREE SURGICAL GLOVES: Brand: PROTEXIS

## (undated) DEVICE — NEEDLE HYPO 22GA L1.5IN BLK S STL HUB POLYPR SHLD REG BVL

## (undated) DEVICE — SYR 10ML CTRL LR LCK NSAF LF --

## (undated) DEVICE — PREP PAD BNS: Brand: CONVERTORS

## (undated) DEVICE — KENDALL SCD EXPRESS SLEEVES, KNEE LENGTH, MEDIUM: Brand: KENDALL SCD

## (undated) DEVICE — SOLUTION IV 1000ML 0.9% SOD CHL

## (undated) DEVICE — CYSTO/BLADDER IRRIGATION SET, REGULATING CLAMP

## (undated) DEVICE — TRAY PREP DRY W/ PREM GLV 2 APPL 6 SPNG 2 UNDPD 1 OVERWRAP

## (undated) DEVICE — TRAY CATH OD16FR SIL URIN M STATLOK STBL DEV SURSTP

## (undated) DEVICE — 3000CC GUARDIAN II: Brand: GUARDIAN

## (undated) DEVICE — DRAPE,REIN 53X77,STERILE: Brand: MEDLINE

## (undated) DEVICE — Z INACTIVE USE 2527070 DRAPE SURG W40XL44IN UNDERBUTTOCK SMS POLYPR W/ PCH BK DISP

## (undated) DEVICE — DEVON™ KNEE AND BODY STRAP 60" X 3" (1.5 M X 7.6 CM): Brand: DEVON

## (undated) DEVICE — SOLUTION IRRIGATION H2O 0797305] ICU MEDICAL INC]

## (undated) DEVICE — SUTURE VCRL SZ 2-0 L27IN ABSRB UD L26MM SH 1/2 CIR J417H

## (undated) DEVICE — PERI/GYN PACK: Brand: CONVERTORS

## (undated) DEVICE — PAD SANIT NPKN 4IN GRD

## (undated) DEVICE — COVER LT HNDL BLU PLAS

## (undated) DEVICE — SOL INJ SOD CL0.9% 10ML PREFIL --

## (undated) DEVICE — DEVICE TRNSF SPIK STL 2008S] MICROTEK MEDICAL INC]

## (undated) DEVICE — SURGICAL PROCEDURE PACK BASIN MAJ SET CUST NO CAUT

## (undated) DEVICE — INFECTION CONTROL KIT SYS

## (undated) DEVICE — REM POLYHESIVE ADULT PATIENT RETURN ELECTRODE: Brand: VALLEYLAB